# Patient Record
Sex: FEMALE | Race: WHITE | NOT HISPANIC OR LATINO | Employment: PART TIME | URBAN - METROPOLITAN AREA
[De-identification: names, ages, dates, MRNs, and addresses within clinical notes are randomized per-mention and may not be internally consistent; named-entity substitution may affect disease eponyms.]

---

## 2024-02-05 ENCOUNTER — APPOINTMENT (OUTPATIENT)
Dept: PRIMARY CARE | Facility: CLINIC | Age: 24
End: 2024-02-05
Payer: COMMERCIAL

## 2024-02-26 ENCOUNTER — APPOINTMENT (OUTPATIENT)
Dept: LAB | Facility: LAB | Age: 24
End: 2024-02-26
Payer: COMMERCIAL

## 2024-02-26 ENCOUNTER — LAB (OUTPATIENT)
Dept: LAB | Facility: LAB | Age: 24
End: 2024-02-26
Payer: COMMERCIAL

## 2024-02-26 ENCOUNTER — OFFICE VISIT (OUTPATIENT)
Dept: PRIMARY CARE | Facility: CLINIC | Age: 24
End: 2024-02-26
Payer: COMMERCIAL

## 2024-02-26 VITALS
BODY MASS INDEX: 36.86 KG/M2 | DIASTOLIC BLOOD PRESSURE: 84 MMHG | WEIGHT: 208 LBS | HEART RATE: 84 BPM | SYSTOLIC BLOOD PRESSURE: 124 MMHG | HEIGHT: 63 IN

## 2024-02-26 DIAGNOSIS — E55.9 VITAMIN D DEFICIENCY: ICD-10-CM

## 2024-02-26 DIAGNOSIS — R53.83 OTHER FATIGUE: ICD-10-CM

## 2024-02-26 DIAGNOSIS — Z00.00 ENCOUNTER FOR PREVENTIVE HEALTH EXAMINATION: Primary | ICD-10-CM

## 2024-02-26 DIAGNOSIS — E55.9 VITAMIN D DEFICIENCY, UNSPECIFIED: Primary | ICD-10-CM

## 2024-02-26 DIAGNOSIS — S69.91XS FINGER INJURY, RIGHT, SEQUELA: ICD-10-CM

## 2024-02-26 DIAGNOSIS — Z11.59 ENCOUNTER FOR HEPATITIS C SCREENING TEST FOR LOW RISK PATIENT: ICD-10-CM

## 2024-02-26 DIAGNOSIS — E66.9 CLASS 2 OBESITY WITHOUT SERIOUS COMORBIDITY WITH BODY MASS INDEX (BMI) OF 36.0 TO 36.9 IN ADULT, UNSPECIFIED OBESITY TYPE: ICD-10-CM

## 2024-02-26 DIAGNOSIS — Z00.00 ENCOUNTER FOR GENERAL ADULT MEDICAL EXAMINATION WITHOUT ABNORMAL FINDINGS: Primary | ICD-10-CM

## 2024-02-26 DIAGNOSIS — R63.5 WEIGHT GAIN: ICD-10-CM

## 2024-02-26 DIAGNOSIS — Z00.00 ENCOUNTER FOR PREVENTIVE HEALTH EXAMINATION: ICD-10-CM

## 2024-02-26 PROBLEM — J45.990 EXERCISE-INDUCED ASTHMA (HHS-HCC): Status: RESOLVED | Noted: 2024-02-26 | Resolved: 2024-02-26

## 2024-02-26 PROBLEM — J45.990 EXERCISE-INDUCED ASTHMA (HHS-HCC): Status: ACTIVE | Noted: 2024-02-26

## 2024-02-26 PROBLEM — E66.812 CLASS 2 OBESITY WITHOUT SERIOUS COMORBIDITY WITH BODY MASS INDEX (BMI) OF 36.0 TO 36.9 IN ADULT: Status: ACTIVE | Noted: 2024-02-26

## 2024-02-26 PROBLEM — E73.9 LACTOSE INTOLERANCE: Status: ACTIVE | Noted: 2021-09-10

## 2024-02-26 PROCEDURE — 1036F TOBACCO NON-USER: CPT | Performed by: STUDENT IN AN ORGANIZED HEALTH CARE EDUCATION/TRAINING PROGRAM

## 2024-02-26 PROCEDURE — 99214 OFFICE O/P EST MOD 30 MIN: CPT | Performed by: STUDENT IN AN ORGANIZED HEALTH CARE EDUCATION/TRAINING PROGRAM

## 2024-02-26 PROCEDURE — 36415 COLL VENOUS BLD VENIPUNCTURE: CPT

## 2024-02-26 PROCEDURE — 3008F BODY MASS INDEX DOCD: CPT | Performed by: STUDENT IN AN ORGANIZED HEALTH CARE EDUCATION/TRAINING PROGRAM

## 2024-02-26 PROCEDURE — 99385 PREV VISIT NEW AGE 18-39: CPT | Performed by: STUDENT IN AN ORGANIZED HEALTH CARE EDUCATION/TRAINING PROGRAM

## 2024-02-26 ASSESSMENT — PATIENT HEALTH QUESTIONNAIRE - PHQ9
2. FEELING DOWN, DEPRESSED OR HOPELESS: NOT AT ALL
1. LITTLE INTEREST OR PLEASURE IN DOING THINGS: NOT AT ALL
SUM OF ALL RESPONSES TO PHQ9 QUESTIONS 1 AND 2: 0

## 2024-02-26 NOTE — PROGRESS NOTES
Subjective   Patient ID: Liss Lund is a 23 y.o. female who presents for Annual Exam.    HPI  Diet is well balanced.  Working on adding regular exercise to their routine.  PAP is up to date.  Vaccines are up to date.  Dental exam is up to date.  Vision exam is up to date.  Patient is fasting for labs today.  Social: Tobacco use- denies   ;   Alcohol use- occasional    Other concerns addressed today include:   Patient states she has been having trouble losing weight and has actually been gaining weight even though she has not changed anything in her lifestyle and actually eats a pretty balanced diet.  She has a family history of hypothyroidism and wants to make sure that it is not a thyroid disorder taking it at this point.  She does feel quite fatigued as well.  She states when she wakes up in the morning, she does not feel rested.  She states that she should go since she gets about 8 hours of typically uninterrupted sleep.  She denies any nighttime waking.  She does not drink alcohol.  Denies any bowel symptoms.    Also would like evaluation of a right third finger growth on the distal end of her finger.  She states that she had an injury to that finger when she was 16 and over the last year or 2, it started getting some chronic swelling and now this enlarging growth.  It is kind of bothering her at this point because it looks weird and feels weird at times.  No pain, numbness or tingling.    Review of Systems  All pertinent positive symptoms are included in the history of present illness.    All other systems have been reviewed and are negative and noncontributory to this patient's current ailments.     Social History     Tobacco Use    Smoking status: Never    Smokeless tobacco: Never   Substance Use Topics    Alcohol use: Not on file      History reviewed. No pertinent surgical history.   No Known Allergies     No current outpatient medications on file.     No current facility-administered medications for  "this visit.        Patient Active Problem List   Diagnosis    Vitamin D deficiency    Finger injury, right, sequela    Other fatigue    Class 2 obesity without serious comorbidity with body mass index (BMI) of 36.0 to 36.9 in adult    Lactose intolerance      Immunization History   Administered Date(s) Administered    DT (pediatric) 04/08/2005    DTaP, Unspecified 02/09/2001, 07/17/2001, 09/26/2001, 01/09/2002, 01/21/2016    HPV 9-valent vaccine (GARDASIL 9) 05/16/2016, 06/16/2016    Hepatitis A vaccine, pediatric/adolescent (HAVRIX, VAQTA) 05/16/2016    Hepatitis B vaccine, pediatric/adolescent (RECOMBIVAX, ENGERIX) 2000, 2000, 02/09/2001    HiB PRP-T conjugate vaccine (HIBERIX, ACTHIB) 02/09/2011    Hib (HbOC) 2000, 2000, 02/09/2001, 01/09/2002    MMR vaccine, subcutaneous (MMR II) 10/17/2001, 04/08/2005    Meningococcal MCV4P 08/30/2012, 08/16/2017    OPV 01/09/2002, 04/08/2005    Poliovirus vaccine, subcutaneous (IPOL) 2000, 2000    Tdap vaccine, age 7 year and older (BOOSTRIX, ADACEL) 08/30/2012, 05/03/2018    Varicella vaccine, subcutaneous (VARIVAX) 07/17/2001, 05/19/2015       Objective   VITALS  /84   Pulse 84   Ht 1.6 m (5' 3\")   Wt 94.3 kg (208 lb)   BMI 36.85 kg/m²      Physical Exam  CONSTITUTIONAL - well nourished, well developed, looks like stated age, in no acute distress, not ill-appearing, and not tired appearing  SKIN - normal skin color and pigmentation, normal skin turgor without rash, lesions, or nodules visualized  HEAD - no trauma, normocephalic  EYES - pupils are equal and reactive to light, extraocular muscles are intact, and normal external exam  ENT - TM's intact, no injection, no signs of infection, uvula midline, normal tongue movement and throat normal, no exudate, nasal passage without discharge and patent  NECK - supple without rigidity, no neck mass was observed, no thyromegaly or thyroid nodules  CHEST - clear to auscultation, no " wheezing, no crackles and no rales, good effort  CARDIAC - regular rate and regular rhythm, no skipped beats, no murmur  ABDOMEN - no organomegaly, soft, nontender, nondistended, normal bowel sounds, no guarding/rebound/rigidity, negative McBurney sign and negative Denson sign  EXTREMITIES - no edema, firm growth at the distal phalanx of the right 3rd finger  NEUROLOGICAL - normal gait, normal balance, normal motor, no ataxia, DTRs equal and symmetrical; alert, oriented and no focal signs  PSYCHIATRIC - alert, pleasant and cordial, age-appropriate  IMMUNOLOGIC - no cervical lymphadenopathy     Assessment/Plan   Diagnoses and all orders for this visit:  Encounter for preventive health examination  A complete history and physical was performed today.  Vaccines are up to date.  PAP is up to date.  Fasting labs ordered today include:  -     CBC; Future  -     Comprehensive Metabolic Panel; Future  -     Hemoglobin A1C; Future  -     Lipid Panel; Future  -     TSH with reflex to Free T4 if abnormal; Future  Encounter for hepatitis C screening test for low risk patient  -     Hepatitis C Antibody; Future  Weight gain  Will start with labs at this time.  I recommend she start monitoring and logging the food she is eating every day.  Start adding in more physical activity.  Vitamin D deficiency  -     Vitamin D 25-Hydroxy,Total (for eval of Vitamin D levels); Future  Other fatigue  Will start with labs.  If they are unremarkable, may be get a sleep study since it sounds like she is having nonrestorative sleep  Finger injury, right, sequela  -     XR hand right 3+ views; Future  -     Referral to Orthopaedic Surgery; Future  Class 2 obesity without serious comorbidity with body mass index (BMI) of 36.0 to 36.9 in adult, unspecified obesity type  Continue striving for a well-balanced diet with regular physical activity    She should follow-up yearly for physical exams and sooner as needed

## 2024-03-11 ENCOUNTER — APPOINTMENT (OUTPATIENT)
Dept: ORTHOPEDIC SURGERY | Facility: CLINIC | Age: 24
End: 2024-03-11
Payer: COMMERCIAL

## 2024-03-18 ENCOUNTER — LAB (OUTPATIENT)
Dept: LAB | Facility: LAB | Age: 24
End: 2024-03-18
Payer: COMMERCIAL

## 2024-03-18 DIAGNOSIS — Z00.00 ENCOUNTER FOR GENERAL ADULT MEDICAL EXAMINATION WITHOUT ABNORMAL FINDINGS: ICD-10-CM

## 2024-03-18 LAB
25(OH)D3 SERPL-MCNC: <7 NG/ML (ref 30–100)
ALBUMIN SERPL BCP-MCNC: 4.4 G/DL (ref 3.4–5)
ALP SERPL-CCNC: 76 U/L (ref 33–110)
ALT SERPL W P-5'-P-CCNC: 24 U/L (ref 7–45)
ANION GAP SERPL CALC-SCNC: 13 MMOL/L (ref 10–20)
AST SERPL W P-5'-P-CCNC: 14 U/L (ref 9–39)
BILIRUB SERPL-MCNC: 0.3 MG/DL (ref 0–1.2)
BUN SERPL-MCNC: 10 MG/DL (ref 6–23)
CALCIUM SERPL-MCNC: 9.1 MG/DL (ref 8.6–10.3)
CHLORIDE SERPL-SCNC: 105 MMOL/L (ref 98–107)
CHOLEST SERPL-MCNC: 120 MG/DL (ref 0–199)
CHOLESTEROL/HDL RATIO: 2.5
CO2 SERPL-SCNC: 24 MMOL/L (ref 21–32)
CREAT SERPL-MCNC: 0.7 MG/DL (ref 0.5–1.05)
EGFRCR SERPLBLD CKD-EPI 2021: >90 ML/MIN/1.73M*2
ERYTHROCYTE [DISTWIDTH] IN BLOOD BY AUTOMATED COUNT: 14.5 % (ref 11.5–14.5)
GLUCOSE SERPL-MCNC: 91 MG/DL (ref 74–99)
HCT VFR BLD AUTO: 41.3 % (ref 36–46)
HCV AB SER QL: NONREACTIVE
HDLC SERPL-MCNC: 47.8 MG/DL
HGB BLD-MCNC: 13.1 G/DL (ref 12–16)
LDLC SERPL CALC-MCNC: 59 MG/DL
MCH RBC QN AUTO: 26.9 PG (ref 26–34)
MCHC RBC AUTO-ENTMCNC: 31.7 G/DL (ref 32–36)
MCV RBC AUTO: 85 FL (ref 80–100)
NON HDL CHOLESTEROL: 72 MG/DL (ref 0–149)
NRBC BLD-RTO: 0 /100 WBCS (ref 0–0)
PLATELET # BLD AUTO: 335 X10*3/UL (ref 150–450)
POTASSIUM SERPL-SCNC: 4.7 MMOL/L (ref 3.5–5.3)
PROT SERPL-MCNC: 7.1 G/DL (ref 6.4–8.2)
RBC # BLD AUTO: 4.87 X10*6/UL (ref 4–5.2)
SODIUM SERPL-SCNC: 137 MMOL/L (ref 136–145)
TRIGL SERPL-MCNC: 66 MG/DL (ref 0–149)
TSH SERPL-ACNC: 2 MIU/L (ref 0.44–3.98)
VLDL: 13 MG/DL (ref 0–40)
WBC # BLD AUTO: 11.4 X10*3/UL (ref 4.4–11.3)

## 2024-03-18 PROCEDURE — 86803 HEPATITIS C AB TEST: CPT

## 2024-03-18 PROCEDURE — 84443 ASSAY THYROID STIM HORMONE: CPT

## 2024-03-18 PROCEDURE — 85027 COMPLETE CBC AUTOMATED: CPT

## 2024-03-18 PROCEDURE — 82306 VITAMIN D 25 HYDROXY: CPT

## 2024-03-18 PROCEDURE — 80061 LIPID PANEL: CPT

## 2024-03-18 PROCEDURE — 80053 COMPREHEN METABOLIC PANEL: CPT

## 2024-03-18 PROCEDURE — 83036 HEMOGLOBIN GLYCOSYLATED A1C: CPT

## 2024-03-18 PROCEDURE — 36415 COLL VENOUS BLD VENIPUNCTURE: CPT

## 2024-03-19 DIAGNOSIS — E55.9 VITAMIN D DEFICIENCY: Primary | ICD-10-CM

## 2024-03-19 LAB
EST. AVERAGE GLUCOSE BLD GHB EST-MCNC: 123 MG/DL
HBA1C MFR BLD: 5.9 %

## 2024-03-19 RX ORDER — ASPIRIN 325 MG
50000 TABLET, DELAYED RELEASE (ENTERIC COATED) ORAL
Qty: 12 CAPSULE | Refills: 3 | Status: SHIPPED | OUTPATIENT
Start: 2024-03-19 | End: 2025-03-19

## 2024-04-08 ENCOUNTER — APPOINTMENT (OUTPATIENT)
Dept: ORTHOPEDIC SURGERY | Facility: CLINIC | Age: 24
End: 2024-04-08
Payer: COMMERCIAL

## 2024-04-22 ENCOUNTER — HOSPITAL ENCOUNTER (OUTPATIENT)
Dept: RADIOLOGY | Facility: CLINIC | Age: 24
Discharge: HOME | End: 2024-04-22
Payer: COMMERCIAL

## 2024-04-22 DIAGNOSIS — S69.91XS FINGER INJURY, RIGHT, SEQUELA: ICD-10-CM

## 2024-04-22 PROCEDURE — 73130 X-RAY EXAM OF HAND: CPT | Mod: RIGHT SIDE | Performed by: STUDENT IN AN ORGANIZED HEALTH CARE EDUCATION/TRAINING PROGRAM

## 2024-04-22 PROCEDURE — 73130 X-RAY EXAM OF HAND: CPT | Mod: RT

## 2024-04-29 ENCOUNTER — OFFICE VISIT (OUTPATIENT)
Dept: ORTHOPEDIC SURGERY | Facility: CLINIC | Age: 24
End: 2024-04-29
Payer: COMMERCIAL

## 2024-04-29 DIAGNOSIS — R22.31 FINGER MASS, RIGHT: ICD-10-CM

## 2024-04-29 DIAGNOSIS — S69.91XS FINGER INJURY, RIGHT, SEQUELA: Primary | ICD-10-CM

## 2024-04-29 PROCEDURE — 1036F TOBACCO NON-USER: CPT | Performed by: ORTHOPAEDIC SURGERY

## 2024-04-29 PROCEDURE — 3008F BODY MASS INDEX DOCD: CPT | Performed by: ORTHOPAEDIC SURGERY

## 2024-04-29 PROCEDURE — 99204 OFFICE O/P NEW MOD 45 MIN: CPT | Performed by: ORTHOPAEDIC SURGERY

## 2024-04-29 NOTE — PROGRESS NOTES
History of Present Illness:  Chief Complaint   Patient presents with    Right Hand - Pain     Right middle finger pain s/p injury 5yrs ago       Patient presents today for evaluation of right middle finger pain/mass.  She had an injury to her middle finger about 5 years ago.  Ever since time of that injury she developed a bump about the dorsal aspect of the DIP joint near her nail.  This has been somewhat increasing in size and a second bump appeared next to the original 1 about 3 to 4 months ago.  She does have some soreness and has also noticed change in her nail.  No numbness or tingling.  No loss of  strength.  No drainage from the mass.    History reviewed. No pertinent past medical history.    Medication Documentation Review Audit       Reviewed by Rupali Hoffmann MA (Medical Assistant) on 04/29/24 at 0936      Medication Order Taking? Sig Documenting Provider Last Dose Status   cholecalciferol (Vitamin D-3) 50,000 unit capsule [id number]  Take 1 capsule (50,000 Units) by mouth 1 (one) time per week. Denisse Ramires, DO  Active                    No Known Allergies    Social History     Socioeconomic History    Marital status: Significant Other     Spouse name: Not on file    Number of children: Not on file    Years of education: Not on file    Highest education level: Not on file   Occupational History    Not on file   Tobacco Use    Smoking status: Never    Smokeless tobacco: Never   Substance and Sexual Activity    Alcohol use: Yes     Alcohol/week: 2.0 standard drinks of alcohol     Types: 1 Glasses of wine, 1 Standard drinks or equivalent per week    Drug use: Never    Sexual activity: Yes     Partners: Male     Birth control/protection: I.U.D.   Other Topics Concern    Not on file   Social History Narrative    Not on file     Social Determinants of Health     Financial Resource Strain: Low Risk  (3/10/2023)    Received from [facility]    Overall Financial Resource Strain (CARDIA)     Difficulty of  Paying Living Expenses: Not very hard   Food Insecurity: No Food Insecurity (3/10/2023)    Received from [facility]    Hunger Vital Sign     Worried About Running Out of Food in the Last Year: Never true     Ran Out of Food in the Last Year: Never true   Transportation Needs: No Transportation Needs (3/10/2023)    Received from [facility]    PRAPARE - Transportation     Lack of Transportation (Medical): No     Lack of Transportation (Non-Medical): No   Physical Activity: Insufficiently Active (3/10/2023)    Received from [facility]    Exercise Vital Sign     Days of Exercise per Week: 2 days     Minutes of Exercise per Session: 30 min   Stress: Stress Concern Present (3/10/2023)    Received from [facility]    [employer] - Occupational Stress Questionnaire     Feeling of Stress : To some extent   Social Connections: Moderately Integrated (3/10/2023)    Received from [facility]    Social Connection and Isolation Panel [NHANES]     Frequency of Communication with Friends and Family: More than three times a week     Frequency of Social Gatherings with Friends and Family: Once a week     Attends Buddhist Services: 1 to 4 times per year     Active Member of Clubs or Organizations: No     Attends Club or Organization Meetings: Never     Marital Status: Living with partner   Intimate Partner Violence: Not At Risk (3/10/2023)    Received from [facility]    Humiliation, Afraid, Rape, and Kick questionnaire     Fear of Current or Ex-Partner: No     Emotionally Abused: No     Physically Abused: No     Sexually Abused: No   Housing Stability: Low Risk  (3/10/2023)    Received from [facility]    Housing Stability Vital Sign     Unable to Pay for Housing in the Last Year: No     Number of Places Lived in the Last Year: 1     Unstable Housing in the Last Year: No       History reviewed. No pertinent surgical history.     Review of Systems   GENERAL: Negative for malaise, significant  weight loss, fever  MUSCULOSKELETAL: see HPI  NEURO:  Negative     Physical Examination  Constitutional: Appears well-developed and well-nourished.  Head: Normocephalic and atraumatic.  Eyes: EOMI grossly  Cardiovascular: Intact distal pulses.   Respiratory: Effort normal. No respiratory distress.  Neurologic: Alert and oriented to person, place, and time.  Skin: Skin is warm and dry.  Hematologic / Lymphatic: No lymphedema, lymphangitis.  Psychiatric: normal mood and affect. Behavior is normal.   Musculoskeletal:  Right middle finger: Palpable mass extending from DIP to proximal eponychial fold.  There are nail plate changes to the tip of the digit with ridging and undulations.  Mass appears to transilluminate.  DIP with 0-25 degrees flexion.  Sensation intact distally.  No extensor lag appreciated.    Radiographs: Right hand radiographs from April 22, 2024 available for my review/interpretation: No fracture/dislocation.  Joint spaces grossly maintained.  Soft tissue fullness about the dorsal aspect of middle finger distal phalanx     Assessment:  Patient with right middle finger mass, possible mucous cyst     Plan:  Nature of the diagnosis was discussed with the patient.  We discussed potential differential diagnoses as well as risks and benefits of continued observation versus surgical excision.  We did discuss potential for recurrence as well as possible need for additional intervention pending pathology review.  After thoroughly reviewing patient wishes to proceed with surgical excision.    Risks and benefits of continued nonoperative versus operative treatment options were reviewed.  The surgical benefits and the potential complications were reviewed with the patient today, as well as the day surgical setting and the likely postoperative course.  Patient understands that the goal of surgery is prevention of worsening symptoms and potential relief of some symptoms.  Risks discussed included, but were not  limited to, residual/recurrent/worsening symptoms, pain, infection, bleeding, stiffness, injury to nerve/blood vessels/tendons, wound healing issues and possible need for reoperation.  I answered the patient's questions and surgical consent reviewed and obtained.  The patient has elected to proceed with surgery and we will schedule it at the patient's convenience.    The patient will followup with us in the operating room and then postoperatively.

## 2024-05-03 ENCOUNTER — HOSPITAL ENCOUNTER (EMERGENCY)
Facility: HOSPITAL | Age: 24
Discharge: HOME | End: 2024-05-03
Attending: STUDENT IN AN ORGANIZED HEALTH CARE EDUCATION/TRAINING PROGRAM
Payer: COMMERCIAL

## 2024-05-03 ENCOUNTER — APPOINTMENT (OUTPATIENT)
Dept: RADIOLOGY | Facility: HOSPITAL | Age: 24
End: 2024-05-03
Payer: COMMERCIAL

## 2024-05-03 VITALS
TEMPERATURE: 98.8 F | BODY MASS INDEX: 36.32 KG/M2 | RESPIRATION RATE: 16 BRPM | WEIGHT: 205 LBS | SYSTOLIC BLOOD PRESSURE: 129 MMHG | DIASTOLIC BLOOD PRESSURE: 88 MMHG | OXYGEN SATURATION: 100 % | HEART RATE: 69 BPM | HEIGHT: 63 IN

## 2024-05-03 DIAGNOSIS — R10.9 ABDOMINAL PAIN, UNSPECIFIED ABDOMINAL LOCATION: Primary | ICD-10-CM

## 2024-05-03 LAB
ALBUMIN SERPL BCP-MCNC: 4.7 G/DL (ref 3.4–5)
ALP SERPL-CCNC: 75 U/L (ref 33–110)
ALT SERPL W P-5'-P-CCNC: 20 U/L (ref 7–45)
ANION GAP SERPL CALC-SCNC: 12 MMOL/L (ref 10–20)
APPEARANCE UR: CLEAR
AST SERPL W P-5'-P-CCNC: 19 U/L (ref 9–39)
BASOPHILS # BLD AUTO: 0.07 X10*3/UL (ref 0–0.1)
BASOPHILS NFR BLD AUTO: 0.7 %
BILIRUB SERPL-MCNC: 0.3 MG/DL (ref 0–1.2)
BILIRUB UR STRIP.AUTO-MCNC: NEGATIVE MG/DL
BUN SERPL-MCNC: 10 MG/DL (ref 6–23)
CALCIUM SERPL-MCNC: 9.7 MG/DL (ref 8.6–10.3)
CHLORIDE SERPL-SCNC: 102 MMOL/L (ref 98–107)
CO2 SERPL-SCNC: 27 MMOL/L (ref 21–32)
COLOR UR: YELLOW
CREAT SERPL-MCNC: 0.86 MG/DL (ref 0.5–1.05)
EGFRCR SERPLBLD CKD-EPI 2021: >90 ML/MIN/1.73M*2
EOSINOPHIL # BLD AUTO: 0.09 X10*3/UL (ref 0–0.7)
EOSINOPHIL NFR BLD AUTO: 0.9 %
ERYTHROCYTE [DISTWIDTH] IN BLOOD BY AUTOMATED COUNT: 13.5 % (ref 11.5–14.5)
GLUCOSE SERPL-MCNC: 94 MG/DL (ref 74–99)
GLUCOSE UR STRIP.AUTO-MCNC: NEGATIVE MG/DL
HCG UR QL IA.RAPID: NEGATIVE
HCT VFR BLD AUTO: 41.5 % (ref 36–46)
HGB BLD-MCNC: 13.3 G/DL (ref 12–16)
IMM GRANULOCYTES # BLD AUTO: 0.02 X10*3/UL (ref 0–0.7)
IMM GRANULOCYTES NFR BLD AUTO: 0.2 % (ref 0–0.9)
KETONES UR STRIP.AUTO-MCNC: NEGATIVE MG/DL
LEUKOCYTE ESTERASE UR QL STRIP.AUTO: NEGATIVE
LIPASE SERPL-CCNC: 15 U/L (ref 9–82)
LYMPHOCYTES # BLD AUTO: 1.89 X10*3/UL (ref 1.2–4.8)
LYMPHOCYTES NFR BLD AUTO: 18.8 %
MAGNESIUM SERPL-MCNC: 1.95 MG/DL (ref 1.6–2.4)
MCH RBC QN AUTO: 26.4 PG (ref 26–34)
MCHC RBC AUTO-ENTMCNC: 32 G/DL (ref 32–36)
MCV RBC AUTO: 83 FL (ref 80–100)
MONOCYTES # BLD AUTO: 0.75 X10*3/UL (ref 0.1–1)
MONOCYTES NFR BLD AUTO: 7.5 %
MUCOUS THREADS #/AREA URNS AUTO: NORMAL /LPF
NEUTROPHILS # BLD AUTO: 7.21 X10*3/UL (ref 1.2–7.7)
NEUTROPHILS NFR BLD AUTO: 71.9 %
NITRITE UR QL STRIP.AUTO: NEGATIVE
NRBC BLD-RTO: 0 /100 WBCS (ref 0–0)
PH UR STRIP.AUTO: 6 [PH]
PLATELET # BLD AUTO: 369 X10*3/UL (ref 150–450)
POTASSIUM SERPL-SCNC: 3.9 MMOL/L (ref 3.5–5.3)
PROT SERPL-MCNC: 8.1 G/DL (ref 6.4–8.2)
PROT UR STRIP.AUTO-MCNC: NEGATIVE MG/DL
RBC # BLD AUTO: 5.03 X10*6/UL (ref 4–5.2)
RBC # UR STRIP.AUTO: ABNORMAL /UL
RBC #/AREA URNS AUTO: NORMAL /HPF
SODIUM SERPL-SCNC: 137 MMOL/L (ref 136–145)
SP GR UR STRIP.AUTO: 1.01
SQUAMOUS #/AREA URNS AUTO: NORMAL /HPF
UROBILINOGEN UR STRIP.AUTO-MCNC: <2 MG/DL
WBC # BLD AUTO: 10 X10*3/UL (ref 4.4–11.3)
WBC #/AREA URNS AUTO: NORMAL /HPF

## 2024-05-03 PROCEDURE — 99284 EMERGENCY DEPT VISIT MOD MDM: CPT | Mod: 25

## 2024-05-03 PROCEDURE — 83690 ASSAY OF LIPASE: CPT | Performed by: STUDENT IN AN ORGANIZED HEALTH CARE EDUCATION/TRAINING PROGRAM

## 2024-05-03 PROCEDURE — 80053 COMPREHEN METABOLIC PANEL: CPT | Performed by: STUDENT IN AN ORGANIZED HEALTH CARE EDUCATION/TRAINING PROGRAM

## 2024-05-03 PROCEDURE — 81001 URINALYSIS AUTO W/SCOPE: CPT | Performed by: STUDENT IN AN ORGANIZED HEALTH CARE EDUCATION/TRAINING PROGRAM

## 2024-05-03 PROCEDURE — 96375 TX/PRO/DX INJ NEW DRUG ADDON: CPT

## 2024-05-03 PROCEDURE — 71046 X-RAY EXAM CHEST 2 VIEWS: CPT | Performed by: SURGERY

## 2024-05-03 PROCEDURE — 2500000001 HC RX 250 WO HCPCS SELF ADMINISTERED DRUGS (ALT 637 FOR MEDICARE OP): Performed by: STUDENT IN AN ORGANIZED HEALTH CARE EDUCATION/TRAINING PROGRAM

## 2024-05-03 PROCEDURE — 74176 CT ABD & PELVIS W/O CONTRAST: CPT

## 2024-05-03 PROCEDURE — 36415 COLL VENOUS BLD VENIPUNCTURE: CPT | Performed by: STUDENT IN AN ORGANIZED HEALTH CARE EDUCATION/TRAINING PROGRAM

## 2024-05-03 PROCEDURE — 2500000004 HC RX 250 GENERAL PHARMACY W/ HCPCS (ALT 636 FOR OP/ED): Performed by: STUDENT IN AN ORGANIZED HEALTH CARE EDUCATION/TRAINING PROGRAM

## 2024-05-03 PROCEDURE — 74176 CT ABD & PELVIS W/O CONTRAST: CPT | Performed by: RADIOLOGY

## 2024-05-03 PROCEDURE — 71046 X-RAY EXAM CHEST 2 VIEWS: CPT

## 2024-05-03 PROCEDURE — 83735 ASSAY OF MAGNESIUM: CPT | Performed by: STUDENT IN AN ORGANIZED HEALTH CARE EDUCATION/TRAINING PROGRAM

## 2024-05-03 PROCEDURE — 2500000005 HC RX 250 GENERAL PHARMACY W/O HCPCS: Performed by: STUDENT IN AN ORGANIZED HEALTH CARE EDUCATION/TRAINING PROGRAM

## 2024-05-03 PROCEDURE — 85025 COMPLETE CBC W/AUTO DIFF WBC: CPT | Performed by: STUDENT IN AN ORGANIZED HEALTH CARE EDUCATION/TRAINING PROGRAM

## 2024-05-03 PROCEDURE — 81025 URINE PREGNANCY TEST: CPT | Performed by: STUDENT IN AN ORGANIZED HEALTH CARE EDUCATION/TRAINING PROGRAM

## 2024-05-03 PROCEDURE — 96374 THER/PROPH/DIAG INJ IV PUSH: CPT

## 2024-05-03 PROCEDURE — 96361 HYDRATE IV INFUSION ADD-ON: CPT

## 2024-05-03 RX ORDER — IBUPROFEN 600 MG/1
600 TABLET ORAL EVERY 6 HOURS PRN
Qty: 30 TABLET | Refills: 0 | Status: SHIPPED | OUTPATIENT
Start: 2024-05-03 | End: 2024-05-03 | Stop reason: SINTOL

## 2024-05-03 RX ORDER — DICYCLOMINE HYDROCHLORIDE 10 MG/1
20 CAPSULE ORAL ONCE
Status: COMPLETED | OUTPATIENT
Start: 2024-05-03 | End: 2024-05-03

## 2024-05-03 RX ORDER — ONDANSETRON 4 MG/1
4 TABLET, ORALLY DISINTEGRATING ORAL EVERY 8 HOURS PRN
Qty: 21 TABLET | Refills: 0 | Status: SHIPPED | OUTPATIENT
Start: 2024-05-03 | End: 2024-05-03

## 2024-05-03 RX ORDER — ORPHENADRINE CITRATE 30 MG/ML
60 INJECTION INTRAMUSCULAR; INTRAVENOUS ONCE
Status: COMPLETED | OUTPATIENT
Start: 2024-05-03 | End: 2024-05-03

## 2024-05-03 RX ORDER — ACETAMINOPHEN 325 MG/1
650 TABLET ORAL EVERY 6 HOURS PRN
Qty: 30 TABLET | Refills: 0 | Status: SHIPPED | OUTPATIENT
Start: 2024-05-03 | End: 2024-05-03

## 2024-05-03 RX ORDER — ACETAMINOPHEN 325 MG/1
975 TABLET ORAL ONCE
Status: COMPLETED | OUTPATIENT
Start: 2024-05-03 | End: 2024-05-03

## 2024-05-03 RX ORDER — ORPHENADRINE CITRATE 100 MG/1
100 TABLET, EXTENDED RELEASE ORAL 2 TIMES DAILY PRN
Qty: 14 TABLET | Refills: 0 | Status: SHIPPED | OUTPATIENT
Start: 2024-05-03 | End: 2024-06-13 | Stop reason: ALTCHOICE

## 2024-05-03 RX ORDER — ACETAMINOPHEN 325 MG/1
650 TABLET ORAL EVERY 6 HOURS PRN
Qty: 30 TABLET | Refills: 0 | Status: SHIPPED | OUTPATIENT
Start: 2024-05-03

## 2024-05-03 RX ORDER — LIDOCAINE 50 MG/G
1 PATCH TOPICAL DAILY
Qty: 5 PATCH | Refills: 0 | Status: SHIPPED | OUTPATIENT
Start: 2024-05-03 | End: 2024-06-13 | Stop reason: WASHOUT

## 2024-05-03 RX ORDER — ONDANSETRON 4 MG/1
4 TABLET, ORALLY DISINTEGRATING ORAL EVERY 8 HOURS PRN
Qty: 21 TABLET | Refills: 0 | Status: SHIPPED | OUTPATIENT
Start: 2024-05-03 | End: 2024-05-10

## 2024-05-03 RX ORDER — KETOROLAC TROMETHAMINE 10 MG/1
10 TABLET, FILM COATED ORAL EVERY 6 HOURS PRN
Qty: 12 TABLET | Refills: 0 | Status: SHIPPED | OUTPATIENT
Start: 2024-05-03 | End: 2024-05-06

## 2024-05-03 RX ORDER — ONDANSETRON HYDROCHLORIDE 2 MG/ML
4 INJECTION, SOLUTION INTRAVENOUS ONCE
Status: COMPLETED | OUTPATIENT
Start: 2024-05-03 | End: 2024-05-03

## 2024-05-03 RX ORDER — KETOROLAC TROMETHAMINE 15 MG/ML
15 INJECTION, SOLUTION INTRAMUSCULAR; INTRAVENOUS ONCE
Status: COMPLETED | OUTPATIENT
Start: 2024-05-03 | End: 2024-05-03

## 2024-05-03 RX ORDER — DICYCLOMINE HYDROCHLORIDE 20 MG/1
20 TABLET ORAL 4 TIMES DAILY PRN
Qty: 30 TABLET | Refills: 0 | Status: SHIPPED | OUTPATIENT
Start: 2024-05-03 | End: 2024-06-13 | Stop reason: ALTCHOICE

## 2024-05-03 RX ADMIN — HYDROMORPHONE HYDROCHLORIDE 0.5 MG: 1 INJECTION, SOLUTION INTRAMUSCULAR; INTRAVENOUS; SUBCUTANEOUS at 20:19

## 2024-05-03 RX ADMIN — Medication 10 ML: at 19:30

## 2024-05-03 RX ADMIN — ORPHENADRINE CITRATE 60 MG: 60 INJECTION INTRAMUSCULAR; INTRAVENOUS at 21:47

## 2024-05-03 RX ADMIN — ONDANSETRON 4 MG: 2 INJECTION INTRAMUSCULAR; INTRAVENOUS at 19:38

## 2024-05-03 RX ADMIN — SODIUM CHLORIDE, POTASSIUM CHLORIDE, SODIUM LACTATE AND CALCIUM CHLORIDE 1000 ML: 600; 310; 30; 20 INJECTION, SOLUTION INTRAVENOUS at 19:39

## 2024-05-03 RX ADMIN — KETOROLAC TROMETHAMINE 15 MG: 15 INJECTION, SOLUTION INTRAMUSCULAR; INTRAVENOUS at 21:47

## 2024-05-03 RX ADMIN — DICYCLOMINE HYDROCHLORIDE 20 MG: 10 CAPSULE ORAL at 19:38

## 2024-05-03 RX ADMIN — ACETAMINOPHEN 975 MG: 325 TABLET ORAL at 19:37

## 2024-05-03 ASSESSMENT — PAIN SCALES - GENERAL: PAINLEVEL_OUTOF10: 8

## 2024-05-03 ASSESSMENT — PAIN DESCRIPTION - ORIENTATION: ORIENTATION: UPPER;MID

## 2024-05-03 ASSESSMENT — PAIN DESCRIPTION - LOCATION: LOCATION: ABDOMEN

## 2024-05-03 ASSESSMENT — PAIN DESCRIPTION - DESCRIPTORS: DESCRIPTORS: ACHING;CRAMPING

## 2024-05-03 ASSESSMENT — PAIN DESCRIPTION - PAIN TYPE: TYPE: ACUTE PAIN

## 2024-05-03 ASSESSMENT — COLUMBIA-SUICIDE SEVERITY RATING SCALE - C-SSRS
6. HAVE YOU EVER DONE ANYTHING, STARTED TO DO ANYTHING, OR PREPARED TO DO ANYTHING TO END YOUR LIFE?: NO
2. HAVE YOU ACTUALLY HAD ANY THOUGHTS OF KILLING YOURSELF?: NO
1. IN THE PAST MONTH, HAVE YOU WISHED YOU WERE DEAD OR WISHED YOU COULD GO TO SLEEP AND NOT WAKE UP?: NO

## 2024-05-03 ASSESSMENT — PAIN - FUNCTIONAL ASSESSMENT: PAIN_FUNCTIONAL_ASSESSMENT: 0-10

## 2024-05-03 NOTE — ED PROVIDER NOTES
HPI   Chief Complaint   Patient presents with    Abdominal Pain     3 days  of epigastric and back pain, getting worse.  Feels like when she goth her gall bladder out.       23-year-old female otherwise healthy here with 3 days of epigastric pain rating to the back.  States she simply woke up with the symptoms, notes mild shortness of breath with the symptoms, but denies chest pain, pleurisy, lower extremity edema, and is not on OCPs.  Also denies fevers, cough or hemoptysis, urinary flank or CVA symptoms, vaginal discharge.  Notes social alcohol use but denies any drug use.      History provided by:  Patient and medical records   used: No                        No data recorded                   Patient History   No past medical history on file.  No past surgical history on file.  Family History   Problem Relation Name Age of Onset    Diabetes Maternal Grandfather Alejandra     Diabetes Paternal Grandmother Lily      Social History     Tobacco Use    Smoking status: Never    Smokeless tobacco: Never   Substance Use Topics    Alcohol use: Yes     Alcohol/week: 2.0 standard drinks of alcohol     Types: 1 Glasses of wine, 1 Standard drinks or equivalent per week    Drug use: Never       Physical Exam   ED Triage Vitals [05/03/24 1921]   Temperature Heart Rate Respirations BP   37.4 °C (99.3 °F) 87 18 122/86      Pulse Ox Temp Source Heart Rate Source Patient Position   100 % Skin Monitor Sitting      BP Location FiO2 (%)     Left arm --       Physical Exam  Constitutional:       Appearance: Normal appearance.   HENT:      Head: Normocephalic and atraumatic.      Nose: No congestion or rhinorrhea.   Eyes:      General: No scleral icterus.     Extraocular Movements: Extraocular movements intact.      Pupils: Pupils are equal, round, and reactive to light.   Cardiovascular:      Rate and Rhythm: Normal rate and regular rhythm.      Pulses: Normal pulses.      Heart sounds: Normal heart sounds.    Pulmonary:      Effort: Pulmonary effort is normal. No respiratory distress.      Breath sounds: Normal breath sounds.   Abdominal:      Palpations: Abdomen is soft.      Tenderness: There is no right CVA tenderness or left CVA tenderness.      Comments: Mildly tender over epigastrium without rebound or guarding.   Musculoskeletal:         General: Normal range of motion.      Cervical back: Normal range of motion and neck supple.   Skin:     General: Skin is warm and dry.      Capillary Refill: Capillary refill takes less than 2 seconds.   Neurological:      General: No focal deficit present.      Mental Status: She is alert and oriented to person, place, and time.      Sensory: No sensory deficit.      Motor: No weakness.      Gait: Gait normal.   Psychiatric:         Mood and Affect: Mood normal.         Behavior: Behavior normal.         ED Course & MDM   Diagnoses as of 05/03/24 2159   Abdominal pain, unspecified abdominal location       Medical Decision Making  23-year-old female here with epigastric pain radiating to back.  Overall well-appearing with no peritonitis, tenderness over epigastrium and left and right CVA.  His lipase is negative which rules out pancreatitis, his urinalysis had a small amount of blood, and given the CVA symptoms, CT abdomen pelvis obtained which does not disclose any acute findings including nephrolithiasis.  Does have some postsurgical dilation of the biliary ducts and a small amount of fatty infiltration of the pancreas, discussed findings with patient, they we will follow-up outpatient to further discuss this.  She has no leukocytosis, her LFTs are reassuring, the rest of her labs are unremarkable.  Will give her symptomatic management at home, follow-up with her primary care doctor and return precautions.    Amount and/or Complexity of Data Reviewed  External Data Reviewed: notes.  Labs: ordered.  Radiology: ordered and independent interpretation  performed.    Risk  Prescription drug management.        Procedure  Procedures     Xiang Caldera MD  Resident  05/03/24 2115       Xiang Caldera MD  Resident  05/03/24 2159       Xiang Caldera MD  Resident  05/03/24 2203

## 2024-05-04 LAB — HOLD SPECIMEN: NORMAL

## 2024-05-04 NOTE — DISCHARGE INSTRUCTIONS
IMPRESSION:  1.  Kidneys appear normal. No hydronephrosis or urinary tract stone.  No findings to explain hematuria.  2. There is ill-defined region hypoattenuation in the pancreatic body  tail junction, without definitive ductal dilation or surrounding  edema. Unclear if there may be a cystic lesion or some focal fatty  infiltration in this region of the pancreas. Correlation with lipase  suggested. Suggest follow-up MRCP/MR pancreas.  3. Biliary dilation, could be due to reservoir effect although  slightly more pronounced than typically seen. The common duct appears  to taper in a normal manner and there are no definitive filling  defects identified or obstructing mass. May further evaluate bile  ducts at time of recommended pancreas MR.

## 2024-05-07 ENCOUNTER — OFFICE VISIT (OUTPATIENT)
Dept: PRIMARY CARE | Facility: CLINIC | Age: 24
End: 2024-05-07
Payer: COMMERCIAL

## 2024-05-07 VITALS
SYSTOLIC BLOOD PRESSURE: 110 MMHG | WEIGHT: 206 LBS | HEART RATE: 90 BPM | DIASTOLIC BLOOD PRESSURE: 82 MMHG | BODY MASS INDEX: 35.17 KG/M2 | HEIGHT: 64 IN

## 2024-05-07 DIAGNOSIS — K86.9 PANCREATIC LESION (HHS-HCC): Primary | ICD-10-CM

## 2024-05-07 PROCEDURE — 99214 OFFICE O/P EST MOD 30 MIN: CPT | Performed by: STUDENT IN AN ORGANIZED HEALTH CARE EDUCATION/TRAINING PROGRAM

## 2024-05-07 PROCEDURE — 1036F TOBACCO NON-USER: CPT | Performed by: STUDENT IN AN ORGANIZED HEALTH CARE EDUCATION/TRAINING PROGRAM

## 2024-05-07 PROCEDURE — 3008F BODY MASS INDEX DOCD: CPT | Performed by: STUDENT IN AN ORGANIZED HEALTH CARE EDUCATION/TRAINING PROGRAM

## 2024-05-07 ASSESSMENT — PATIENT HEALTH QUESTIONNAIRE - PHQ9
1. LITTLE INTEREST OR PLEASURE IN DOING THINGS: NOT AT ALL
SUM OF ALL RESPONSES TO PHQ9 QUESTIONS 1 AND 2: 0
2. FEELING DOWN, DEPRESSED OR HOPELESS: NOT AT ALL

## 2024-05-07 NOTE — PROGRESS NOTES
Liss Lund is a 23 y.o. year old female presenting for Hospital Follow-up       HPI:  Patient presented to the emergency room on May 3, 4 days ago with complaint of back pain that radiated to the front as well as some blood in her urine.  She states that those symptoms have resolved, but she is following up because there was an incidental finding of a pancreatic lesion on the body/tail junction that was not seen very clearly on the imaging and it was recommended that she get follow-up with MRI.  The ER provider also put in an oncology referral for her.  She is wondering if there is anything else she should be doing right now with this finding.  Otherwise, has been feeling well without any sick symptoms.    ROS:   All pertinent positive symptoms are included in history of present illness.    All other systems have been reviewed and are negative and noncontributory to this patient's current ailments.    Current Outpatient Medications   Medication Sig Dispense Refill    acetaminophen (TylenoL) 325 mg tablet Take 2 tablets (650 mg) by mouth every 6 hours if needed for mild pain (1 - 3) or moderate pain (4 - 6). 30 tablet 0    cholecalciferol (Vitamin D-3) 50,000 unit capsule Take 1 capsule (50,000 Units) by mouth 1 (one) time per week. 12 capsule 3    dicyclomine (Bentyl) 20 mg tablet Take 1 tablet (20 mg) by mouth 4 times a day as needed (bloating, cramping). 30 tablet 0    ondansetron ODT (Zofran-ODT) 4 mg disintegrating tablet Take 1 tablet (4 mg) by mouth every 8 hours if needed for nausea or vomiting for up to 7 days. 21 tablet 0    orphenadrine (Norflex) 100 mg 12 hr tablet Take 1 tablet (100 mg) by mouth 2 times a day as needed for muscle spasms for up to 7 days. Do not crush, chew, or split. 14 tablet 0    lidocaine (Lidoderm) 5 % patch Place 1 patch over 12 hours on the skin once daily. 12 hours on then 12 hours off (Patient not taking: Reported on 5/7/2024) 5 patch 0     No current facility-administered  "medications for this visit.       OBJECTIVE  Visit Vitals  /82   Pulse 90   Ht 1.613 m (5' 3.5\")   Wt 93.4 kg (206 lb)   LMP 04/22/2024 Comment: IUD   BMI 35.92 kg/m²   OB Status Having periods   Smoking Status Never   BSA 2.05 m²        Physical Exam:  GENERAL: Alert, oriented, pleasant, in no acute distress  HEENT: Head normocephalic, atraumatic  EXTREMITIES: no edema, no cyanosis  PSYCH: Appropriate mood and affect  SKIN: No rashes or lesions appreciated    Assessment/Plan   Diagnoses and all orders for this visit:  Pancreatic lesion ([facility])  ED provider note reviewed from May 3rd  CBC, CMP, Lipase, UA reviewed  CT a/p result reviewed  Order MR pancreas with GI/pancreas consult for further management of the pancreatic lesion incidentally noted on the CT  -     MRCP pancreas w and wo IV contrast; Future  -     Referral to Gastroenterology; Future           "

## 2024-05-23 ENCOUNTER — APPOINTMENT (OUTPATIENT)
Dept: RADIOLOGY | Facility: CLINIC | Age: 24
End: 2024-05-23
Payer: COMMERCIAL

## 2024-05-24 ENCOUNTER — HOSPITAL ENCOUNTER (OUTPATIENT)
Dept: RADIOLOGY | Facility: CLINIC | Age: 24
Discharge: HOME | End: 2024-05-24
Payer: COMMERCIAL

## 2024-05-24 DIAGNOSIS — K86.9 PANCREATIC LESION (HHS-HCC): ICD-10-CM

## 2024-05-24 PROCEDURE — 2550000001 HC RX 255 CONTRASTS: Performed by: STUDENT IN AN ORGANIZED HEALTH CARE EDUCATION/TRAINING PROGRAM

## 2024-05-24 PROCEDURE — 74183 MRI ABD W/O CNTR FLWD CNTR: CPT

## 2024-05-24 PROCEDURE — A9575 INJ GADOTERATE MEGLUMI 0.1ML: HCPCS | Performed by: STUDENT IN AN ORGANIZED HEALTH CARE EDUCATION/TRAINING PROGRAM

## 2024-05-24 RX ORDER — GADOTERATE MEGLUMINE 376.9 MG/ML
19 INJECTION INTRAVENOUS
Status: COMPLETED | OUTPATIENT
Start: 2024-05-24 | End: 2024-05-24

## 2024-05-24 RX ADMIN — GADOTERATE MEGLUMINE 19 ML: 376.9 INJECTION INTRAVENOUS at 11:49

## 2024-05-27 ENCOUNTER — HOSPITAL ENCOUNTER (OUTPATIENT)
Dept: RADIOLOGY | Facility: HOSPITAL | Age: 24
End: 2024-05-27
Payer: COMMERCIAL

## 2024-05-29 ENCOUNTER — HOSPITAL ENCOUNTER (OUTPATIENT)
Facility: HOSPITAL | Age: 24
Setting detail: OUTPATIENT SURGERY
Discharge: HOME | End: 2024-05-29
Attending: ORTHOPAEDIC SURGERY | Admitting: ORTHOPAEDIC SURGERY
Payer: COMMERCIAL

## 2024-05-29 VITALS
OXYGEN SATURATION: 98 % | TEMPERATURE: 96.8 F | HEART RATE: 72 BPM | RESPIRATION RATE: 16 BRPM | BODY MASS INDEX: 35.19 KG/M2 | HEIGHT: 64 IN | DIASTOLIC BLOOD PRESSURE: 76 MMHG | SYSTOLIC BLOOD PRESSURE: 113 MMHG | WEIGHT: 206.13 LBS

## 2024-05-29 DIAGNOSIS — S69.91XS FINGER INJURY, RIGHT, SEQUELA: Primary | ICD-10-CM

## 2024-05-29 DIAGNOSIS — R22.31 FINGER MASS, RIGHT: ICD-10-CM

## 2024-05-29 PROCEDURE — 88304 TISSUE EXAM BY PATHOLOGIST: CPT | Performed by: STUDENT IN AN ORGANIZED HEALTH CARE EDUCATION/TRAINING PROGRAM

## 2024-05-29 PROCEDURE — 3600000008 HC OR TIME - EACH INCREMENTAL 1 MINUTE - PROCEDURE LEVEL THREE: Performed by: ORTHOPAEDIC SURGERY

## 2024-05-29 PROCEDURE — 2500000005 HC RX 250 GENERAL PHARMACY W/O HCPCS: Performed by: ORTHOPAEDIC SURGERY

## 2024-05-29 PROCEDURE — 7100000009 HC PHASE TWO TIME - INITIAL BASE CHARGE: Performed by: ORTHOPAEDIC SURGERY

## 2024-05-29 PROCEDURE — 7100000010 HC PHASE TWO TIME - EACH INCREMENTAL 1 MINUTE: Performed by: ORTHOPAEDIC SURGERY

## 2024-05-29 PROCEDURE — 3600000003 HC OR TIME - INITIAL BASE CHARGE - PROCEDURE LEVEL THREE: Performed by: ORTHOPAEDIC SURGERY

## 2024-05-29 PROCEDURE — 26116 EXC HAND TUM DEEP < 1.5 CM: CPT | Performed by: ORTHOPAEDIC SURGERY

## 2024-05-29 PROCEDURE — 88304 TISSUE EXAM BY PATHOLOGIST: CPT | Mod: TC,GEALAB,PARLAB | Performed by: ORTHOPAEDIC SURGERY

## 2024-05-29 PROCEDURE — A4217 STERILE WATER/SALINE, 500 ML: HCPCS | Performed by: ORTHOPAEDIC SURGERY

## 2024-05-29 PROCEDURE — 2500000001 HC RX 250 WO HCPCS SELF ADMINISTERED DRUGS (ALT 637 FOR MEDICARE OP): Performed by: ORTHOPAEDIC SURGERY

## 2024-05-29 PROCEDURE — 2500000004 HC RX 250 GENERAL PHARMACY W/ HCPCS (ALT 636 FOR OP/ED): Performed by: ORTHOPAEDIC SURGERY

## 2024-05-29 RX ORDER — SODIUM CHLORIDE 0.9 G/100ML
IRRIGANT IRRIGATION AS NEEDED
Status: DISCONTINUED | OUTPATIENT
Start: 2024-05-29 | End: 2024-05-29 | Stop reason: HOSPADM

## 2024-05-29 RX ORDER — HYDROCODONE BITARTRATE AND ACETAMINOPHEN 5; 325 MG/1; MG/1
1 TABLET ORAL EVERY 6 HOURS PRN
Qty: 6 TABLET | Refills: 0 | Status: SHIPPED | OUTPATIENT
Start: 2024-05-29 | End: 2024-05-31

## 2024-05-29 RX ORDER — LIDOCAINE HYDROCHLORIDE AND EPINEPHRINE 10; 10 MG/ML; UG/ML
INJECTION, SOLUTION INFILTRATION; PERINEURAL AS NEEDED
Status: DISCONTINUED | OUTPATIENT
Start: 2024-05-29 | End: 2024-05-29 | Stop reason: HOSPADM

## 2024-05-29 RX ORDER — IBUPROFEN 200 MG
600 TABLET ORAL EVERY 8 HOURS PRN
COMMUNITY

## 2024-05-29 RX ORDER — CHLORHEXIDINE GLUCONATE 40 MG/ML
SOLUTION TOPICAL AS NEEDED
Status: DISCONTINUED | OUTPATIENT
Start: 2024-05-29 | End: 2024-05-29 | Stop reason: HOSPADM

## 2024-05-29 RX ORDER — BUPIVACAINE HCL/EPINEPHRINE 0.5-1:200K
VIAL (ML) INJECTION AS NEEDED
Status: DISCONTINUED | OUTPATIENT
Start: 2024-05-29 | End: 2024-05-29 | Stop reason: HOSPADM

## 2024-05-29 ASSESSMENT — PAIN SCALES - GENERAL
PAINLEVEL_OUTOF10: 0 - NO PAIN
PAINLEVEL_OUTOF10: 0 - NO PAIN

## 2024-05-29 ASSESSMENT — PAIN - FUNCTIONAL ASSESSMENT
PAIN_FUNCTIONAL_ASSESSMENT: 0-10
PAIN_FUNCTIONAL_ASSESSMENT: 0-10

## 2024-05-29 NOTE — H&P
History of Present Illness:       Chief Complaint   Patient presents with    Right Hand - Pain       Right middle finger pain s/p injury 5yrs ago         Patient presents today for evaluation of right middle finger pain/mass.  She had an injury to her middle finger about 5 years ago.  Ever since time of that injury she developed a bump about the dorsal aspect of the DIP joint near her nail.  This has been somewhat increasing in size and a second bump appeared next to the original 1 about 3 to 4 months ago.  She does have some soreness and has also noticed change in her nail.  No numbness or tingling.  No loss of  strength.  No drainage from the mass.     Medical History   History reviewed. No pertinent past medical history.        Medication Documentation Review Audit         Reviewed by Rupali Hoffmann MA (Medical Assistant) on 04/29/24 at 0936       Medication Order Taking? Sig Documenting Provider Last Dose Status   cholecalciferol (Vitamin D-3) 50,000 unit capsule [id number]   Take 1 capsule (50,000 Units) by mouth 1 (one) time per week. Denisse Ramires, DO   Active                          No Known Allergies     Social History               Socioeconomic History    Marital status: Significant Other       Spouse name: Not on file    Number of children: Not on file    Years of education: Not on file    Highest education level: Not on file   Occupational History    Not on file   Tobacco Use    Smoking status: Never    Smokeless tobacco: Never   Substance and Sexual Activity    Alcohol use: Yes       Alcohol/week: 2.0 standard drinks of alcohol       Types: 1 Glasses of wine, 1 Standard drinks or equivalent per week    Drug use: Never    Sexual activity: Yes       Partners: Male       Birth control/protection: I.U.D.   Other Topics Concern    Not on file   Social History Narrative    Not on file      Social Determinants of Health           Financial Resource Strain: Low Risk  (3/10/2023)     Received from  [facility]     Overall Financial Resource Strain (CARDIA)      Difficulty of Paying Living Expenses: Not very hard   Food Insecurity: No Food Insecurity (3/10/2023)     Received from [facility]     Hunger Vital Sign      Worried About Running Out of Food in the Last Year: Never true      Ran Out of Food in the Last Year: Never true   Transportation Needs: No Transportation Needs (3/10/2023)     Received from [facility]     PRAPARE - Transportation      Lack of Transportation (Medical): No      Lack of Transportation (Non-Medical): No   Physical Activity: Insufficiently Active (3/10/2023)     Received from [facility]     Exercise Vital Sign      Days of Exercise per Week: 2 days      Minutes of Exercise per Session: 30 min   Stress: Stress Concern Present (3/10/2023)     Received from [facility]     [employer] [place] [employer] - Occupational Stress Questionnaire      Feeling of Stress : To some extent   Social Connections: Moderately Integrated (3/10/2023)     Received from [facility]     Social Connection and Isolation Panel [NHANES]      Frequency of Communication with Friends and Family: More than three times a week      Frequency of Social Gatherings with Friends and Family: Once a week      Attends Orthodox Services: 1 to 4 times per year      Active Member of Clubs or Organizations: No      Attends Club or Organization Meetings: Never      Marital Status: Living with partner   Intimate Partner Violence: Not At Risk (3/10/2023)     Received from [facility]     Humiliation, Afraid, Rape, and Kick questionnaire      Fear of Current or Ex-Partner: No      Emotionally Abused: No      Physically Abused: No      Sexually Abused: No   Housing Stability: Low Risk  (3/10/2023)     Received from [facility]     Housing Stability Vital Sign      Unable to Pay for Housing in the Last Year: No      Number of Places Lived in the Last Year: 1      Unstable Housing in the Last Year: No             Surgical History   History reviewed. No pertinent surgical history.        Review of Systems   GENERAL: Negative for malaise, significant weight loss, fever  MUSCULOSKELETAL: see HPI  NEURO:  Negative     Physical Examination  Constitutional: Appears well-developed and well-nourished.  Head: Normocephalic and atraumatic.  Eyes: EOMI grossly  Cardiovascular: Intact distal pulses.   Respiratory: Effort normal. No respiratory distress.  Neurologic: Alert and oriented to person, place, and time.  Skin: Skin is warm and dry.  Hematologic / Lymphatic: No lymphedema, lymphangitis.  Psychiatric: normal mood and affect. Behavior is normal.   Musculoskeletal:  Right middle finger: Palpable mass extending from DIP to proximal eponychial fold.  There are nail plate changes to the tip of the digit with ridging and undulations.  Mass appears to transilluminate.  DIP with 0-25 degrees flexion.  Sensation intact distally.  No extensor lag appreciated.     Radiographs: Right hand radiographs from April 22, 2024 available for my review/interpretation: No fracture/dislocation.  Joint spaces grossly maintained.  Soft tissue fullness about the dorsal aspect of middle finger distal phalanx     Assessment:  Patient with right middle finger mass, possible mucous cyst     Plan:  Nature of the diagnosis was discussed with the patient.  We discussed potential differential diagnoses as well as risks and benefits of continued observation versus surgical excision.  We did discuss potential for recurrence as well as possible need for additional intervention pending pathology review.  After thoroughly reviewing patient wishes to proceed with surgical excision.     Risks and benefits of continued nonoperative versus operative treatment options were reviewed.  The surgical benefits and the potential complications were reviewed with the patient today, as well as the day surgical setting and the likely postoperative course.  Patient understands that  the goal of surgery is prevention of worsening symptoms and potential relief of some symptoms.  Risks discussed included, but were not limited to, residual/recurrent/worsening symptoms, pain, infection, bleeding, stiffness, injury to nerve/blood vessels/tendons, wound healing issues and possible need for reoperation.

## 2024-05-29 NOTE — DISCHARGE INSTRUCTIONS
Dr. Street Post-Operative Instructions  Hand/Wrist/Elbow    Activity:  Rest on the day of surgery.  Gradually resume your regular diet.    Anesthesia:  Numbing medication may last for 8-24 hours.    Post-Operative Medications:  You may resume your regular medications (including blood thinners).  You have been given a prescription for pain medication as needed.  You may also take over-the-counter anti-inflammatories and/or Tylenol for pain relief.  If you are taking the prescribed pain medication you must limit additional Tylenol (acetaminophen) intake to avoid overdose.    Dressings:  Keep your dressings clean and dry.  You may cover with a plastic bag or cast cover and seal bag to your skin above the bandage for showering.  If your dressing becomes wet or significantly bloodstained call the office at [phone].  Okay to remove outer dressings after 2-3 days and shower/wash hands.  Do not soak wound (I.e. no swimming pool, hot tub or dishes).    Post-Operative Care:  Keep the surgical site elevated above the level of your heart to limit swelling.  If your fingers are not included in the dressing you are encouraged to move your fingers regularly (full fist and full extension).  Regularly ice the surgical site.  You may also apply ice pack above the level of the dressing and this will cool the blood as it travels towards the surgical site.    Call Surgeon/Office at any time for:           Office Number: [phone]  Excessive bleeding  Loss of feeling (The local numbing medicine from surgery typically lasts 8-12 hours.  Nerve blocks can last 18-36 hours.)  Tight dressing: Make sure that you are elevating the operative site appropriately.  If no relief then call the office.                                                                                                        Circulation issues:  If fingers change to white or blue  Concerns/Problems with your surgery

## 2024-05-29 NOTE — OP NOTE
Pre-Operative Diagnosis: Right middle finger mass with associated nail deformity  Post-Operative Diagnosis: same, likely giant cell tumor  Procedure: Right middle finger mass excision  Surgeon: Yenifer  Assistant: None  Anesthesia: Local  Complications: none  Estimated blood loss: Minimal  Specimen: Right middle finger mass  Implants:  Nothing was implanted during the procedure  Findings: See below  Disposition: Good/PACU      Indications: Patient with slowly enlarging right middle finger mass after trauma.  She has an associated nail deformity.  We discussed risks and benefits of nonoperative versus operative treatment options.  After thoroughly reviewing patient wished to proceed with excisional biopsy.  We did discuss potential for recurrence as well as possible need for additional intervention pending pathology review.  She also understands potential for continued/worsening nail deformity.  Expected operative and postoperative courses reviewed and questions addressed.    Operative course: Patient was greeted in the preoperative holding area and the operative site was marked with indelible marker.  A digital block was performed with a total of 4 mL of half-and-half lidocaine and Marcaine with epinephrine using sterile technique after confirming patient identifiers.  Patient was brought back to the operating room suite where right upper extremity was prepped and draped in standard sterile fashion.  Timeout procedure was performed as per standard protocol.  Turnicot was applied to the digit.  Following this a longitudinal incision was made directly overlying the mass.  Gentle spreading was carried down through the subcutaneous tissue where the mass was identified.  It had the gross appearance of a giant cell tumor.  Care was taken to dissect the mass from surrounding soft tissues while carefully protecting the terminal extensor tendon.  A portion of this mass was actually found to travel to the other side of the  digit.  The other side the extensor tendon was able to be accessed through the initial incision and the remaining mass was carefully dissected from surrounding tissues.  There was no area of adherence on the side and the mass seem to be most adherent to the dorsal capsule of the DIP joint.  Dissection distally demonstrated the mass pushing on the germinal matrix.  Care was taken during this dissection to attempt preservation of the germinal matrix while fully removing the mass.  The mass was passed off the field for pathology analysis.  It measured approximately 1.5 x 1 x 0.5 cm.  Wound was irrigated and hemostasis achieved with bipolar electrocautery.  No extensor remained intact.  Wound was reapproximated with 4-0 Monocryl in a horizontal mattress fashion.  Exofin applied to the skin.  Dry sterile dressings were applied and patient was taken to recovery for further care.    She will follow-up in approximately 2 weeks for wound check and pathology review.  We did discuss potential for recurrence and need for additional intervention pending pathology review.

## 2024-06-11 ENCOUNTER — OFFICE VISIT (OUTPATIENT)
Dept: ORTHOPEDIC SURGERY | Facility: CLINIC | Age: 24
End: 2024-06-11
Payer: COMMERCIAL

## 2024-06-11 DIAGNOSIS — R22.31 FINGER MASS, RIGHT: Primary | ICD-10-CM

## 2024-06-11 PROCEDURE — 1036F TOBACCO NON-USER: CPT | Performed by: ORTHOPAEDIC SURGERY

## 2024-06-11 PROCEDURE — 3008F BODY MASS INDEX DOCD: CPT | Performed by: ORTHOPAEDIC SURGERY

## 2024-06-11 PROCEDURE — 99024 POSTOP FOLLOW-UP VISIT: CPT | Performed by: ORTHOPAEDIC SURGERY

## 2024-06-11 ASSESSMENT — PAIN - FUNCTIONAL ASSESSMENT: PAIN_FUNCTIONAL_ASSESSMENT: NO/DENIES PAIN

## 2024-06-11 NOTE — PROGRESS NOTES
History of Present Illness  Chief Complaint   Patient presents with    Right Hand - Post-op     5/29/24 right middle finger mass excision      Some residual stiffness continues to improve.     Examination  Right middle finger:  Incision is well healing. No signs of infection.  Sensation grossly intact distally.  Capillary refill less than 2 seconds.  0-40 degrees DIP flexion.  Terminal extensor intact.     Assessment:  Patient status post right middle finger mass excision.     Plan  Patient will begin scar tissue massage.  Continue mobilization and progression of activities.  We discussed expected recovery course as well as likely peak reaction at 4 to 6 weeks postoperatively.  Follow-up as needed.  We discussed likely pathology results of giant cell tumor, but she understands that pathology is still pending.  We did discuss potential for recurrence.  Questions addressed.

## 2024-06-13 ENCOUNTER — TELEMEDICINE (OUTPATIENT)
Dept: PRIMARY CARE | Facility: CLINIC | Age: 24
End: 2024-06-13
Payer: COMMERCIAL

## 2024-06-13 DIAGNOSIS — L03.011 CELLULITIS OF FINGER OF RIGHT HAND: Primary | ICD-10-CM

## 2024-06-13 PROCEDURE — 99213 OFFICE O/P EST LOW 20 MIN: CPT | Performed by: NURSE PRACTITIONER

## 2024-06-13 PROCEDURE — 3008F BODY MASS INDEX DOCD: CPT | Performed by: NURSE PRACTITIONER

## 2024-06-13 PROCEDURE — 1036F TOBACCO NON-USER: CPT | Performed by: NURSE PRACTITIONER

## 2024-06-13 RX ORDER — DOXYCYCLINE 100 MG/1
100 CAPSULE ORAL 2 TIMES DAILY
Qty: 20 CAPSULE | Refills: 0 | Status: SHIPPED | OUTPATIENT
Start: 2024-06-13 | End: 2024-06-23

## 2024-06-13 ASSESSMENT — ENCOUNTER SYMPTOMS
APPETITE CHANGE: 0
CONSTITUTIONAL NEGATIVE: 1
FATIGUE: 0
RESPIRATORY NEGATIVE: 1
CHILLS: 0
FEVER: 0
ARTHRALGIAS: 1

## 2024-06-13 NOTE — PROGRESS NOTES
Patient had a mass removed from her right middle finger on 5/29/24 with Dr. Street. The surgery went well. She saw her surgeon in follow up on 6/11/24 and everything was going well and she was healing as expected. Yesterday, her right finger started to swell and became very tender to the touch. She woke up in a lot of pain because of it.     Yesterday randomly woke up in a lot pain and her right finger was swollen. She did not injure it in any way. Patient has no fevers. She is feeling well overall. She does not have a history of a staph infection.    Swelling no drainage   Huts to move it   No known injury            Review of Systems   Constitutional: Negative.  Negative for appetite change, chills, fatigue and fever.   HENT: Negative.     Respiratory: Negative.     Musculoskeletal:  Positive for arthralgias.        Right middle finger swelling      Objective   There were no vitals taken for this visit.    Physical Exam  Constitutional:       General: She is not in acute distress.     Appearance: Normal appearance. She is not toxic-appearing or diaphoretic.   HENT:      Head: Atraumatic.   Pulmonary:      Effort: Pulmonary effort is normal.   Skin:     Comments: Patient with right middle finger swelling. There is an area of scabbing that is not new. The finger is slightly discolored post surgery - it is not red and has not changed. The swelling is pronounced since her surgery and follow up on 6/11. Pt can still move the digit. There is no purulent drainage.    Neurological:      Mental Status: She is alert.     Assessment/Plan   Problem List Items Addressed This Visit    None  Visit Diagnoses         Codes    Cellulitis of finger of right hand    -  Primary L03.011    Relevant Medications    doxycycline (Vibramycin) 100 mg capsule        This visit was completed via Epic. All issues as below were discussed and addressed but no physical exam was performed. If it was felt that the patient should be evaluated in the  clinic then they were directed there. The patient verbally consented to the visit. The patient verbally confirmed that she is located in the [facility] for the visit.     Approximately 15 minutes spent performing virtual video visit.     Patient with finger swelling present s/p finger surgery. This is a new onset swelling. Will cover for infection with doxycycline. Patient to continue to keep finger clean and dry. Reached out to pt's surgeon and will try to accommodate follow up appt tomorrow for pt. Advised ER for any worsening finger swelling, pain, fevers, chills or new/concerning symptoms; she agreed.

## 2024-06-14 ENCOUNTER — TELEPHONE (OUTPATIENT)
Dept: ORTHOPEDIC SURGERY | Facility: CLINIC | Age: 24
End: 2024-06-14
Payer: COMMERCIAL

## 2024-06-14 LAB
LABORATORY COMMENT REPORT: NORMAL
PATH REPORT.FINAL DX SPEC: NORMAL
PATH REPORT.GROSS SPEC: NORMAL
PATH REPORT.RELEVANT HX SPEC: NORMAL
PATH REPORT.TOTAL CANCER: NORMAL

## 2024-06-14 NOTE — TELEPHONE ENCOUNTER
I was contacted by patient's primary care provider yesterday regarding some swelling and redness that patient noticed in her operative finger yesterday morning.  Primary care provider prescribed antibiotic and she just took first dose this morning.  Still able to move the finger, but some soreness around the surgical site.  No fevers or chills.  Instructed patient that she should report to emergency room if any acute worsening.  She will tentatively plan on seeing me in clinic on Monday, but will call/report to emergency room if any worsening prior to then.  Questions addressed.    We did also review pathology results.  Potential for recurrence discussed.

## 2024-06-17 ENCOUNTER — APPOINTMENT (OUTPATIENT)
Dept: GASTROENTEROLOGY | Facility: HOSPITAL | Age: 24
End: 2024-06-17
Payer: COMMERCIAL

## 2024-07-12 ENCOUNTER — PATIENT MESSAGE (OUTPATIENT)
Dept: PRIMARY CARE | Facility: CLINIC | Age: 24
End: 2024-07-12
Payer: COMMERCIAL

## 2024-07-22 ENCOUNTER — APPOINTMENT (OUTPATIENT)
Dept: GASTROENTEROLOGY | Facility: HOSPITAL | Age: 24
End: 2024-07-22
Payer: COMMERCIAL

## 2025-03-10 ENCOUNTER — APPOINTMENT (OUTPATIENT)
Dept: PRIMARY CARE | Facility: CLINIC | Age: 25
End: 2025-03-10
Payer: COMMERCIAL

## 2025-06-02 ENCOUNTER — APPOINTMENT (OUTPATIENT)
Dept: PRIMARY CARE | Facility: CLINIC | Age: 25
End: 2025-06-02
Payer: COMMERCIAL

## 2025-06-30 ENCOUNTER — APPOINTMENT (OUTPATIENT)
Dept: PRIMARY CARE | Facility: CLINIC | Age: 25
End: 2025-06-30
Payer: COMMERCIAL

## 2025-06-30 VITALS
HEIGHT: 64 IN | SYSTOLIC BLOOD PRESSURE: 120 MMHG | HEART RATE: 95 BPM | BODY MASS INDEX: 38.93 KG/M2 | DIASTOLIC BLOOD PRESSURE: 80 MMHG | OXYGEN SATURATION: 97 % | WEIGHT: 228 LBS

## 2025-06-30 DIAGNOSIS — Z13.1 SCREENING FOR DIABETES MELLITUS: ICD-10-CM

## 2025-06-30 DIAGNOSIS — R10.2 PELVIC PAIN: ICD-10-CM

## 2025-06-30 DIAGNOSIS — Z00.00 ENCOUNTER FOR PREVENTIVE HEALTH EXAMINATION: Primary | ICD-10-CM

## 2025-06-30 DIAGNOSIS — R35.0 URINARY FREQUENCY: ICD-10-CM

## 2025-06-30 DIAGNOSIS — R53.83 OTHER FATIGUE: ICD-10-CM

## 2025-06-30 DIAGNOSIS — E55.9 VITAMIN D DEFICIENCY: ICD-10-CM

## 2025-06-30 DIAGNOSIS — R73.03 PRE-DIABETES: ICD-10-CM

## 2025-06-30 DIAGNOSIS — E28.2 PCOS (POLYCYSTIC OVARIAN SYNDROME): ICD-10-CM

## 2025-06-30 DIAGNOSIS — G25.81 RESTLESS LEG SYNDROME: ICD-10-CM

## 2025-06-30 DIAGNOSIS — Z13.29 SCREENING FOR ENDOCRINE DISORDER: ICD-10-CM

## 2025-06-30 DIAGNOSIS — Z13.9 SCREENING FOR CONDITION: ICD-10-CM

## 2025-06-30 DIAGNOSIS — Z13.6 SCREENING FOR CARDIOVASCULAR CONDITION: ICD-10-CM

## 2025-06-30 PROBLEM — K52.9 GASTROENTERITIS: Status: ACTIVE | Noted: 2022-10-25

## 2025-06-30 PROBLEM — Z98.890 POST-OPERATIVE STATE: Status: ACTIVE | Noted: 2021-03-15

## 2025-06-30 PROBLEM — O22.00 VARICOSE VEINS DURING PREGNANCY (HHS-HCC): Status: RESOLVED | Noted: 2022-10-22 | Resolved: 2025-06-30

## 2025-06-30 PROBLEM — O42.90 PROM (PREMATURE RUPTURE OF MEMBRANES) (HHS-HCC): Status: ACTIVE | Noted: 2022-12-08

## 2025-06-30 PROBLEM — O22.00 VARICOSE VEINS DURING PREGNANCY (HHS-HCC): Status: ACTIVE | Noted: 2022-10-22

## 2025-06-30 PROBLEM — K80.50 CHOLELITHIASIS, COMMON BILE DUCT: Status: ACTIVE | Noted: 2021-02-17

## 2025-06-30 PROBLEM — R31.29 MICROSCOPIC HEMATURIA: Status: ACTIVE | Noted: 2025-06-30

## 2025-06-30 PROBLEM — M79.18 MUSCULOSKELETAL PAIN: Status: ACTIVE | Noted: 2018-06-03

## 2025-06-30 PROBLEM — O42.90 PROM (PREMATURE RUPTURE OF MEMBRANES) (HHS-HCC): Status: RESOLVED | Noted: 2022-12-08 | Resolved: 2025-06-30

## 2025-06-30 PROBLEM — B37.31 CANDIDAL VULVOVAGINITIS: Status: ACTIVE | Noted: 2018-04-25

## 2025-06-30 PROBLEM — K21.9 GASTROESOPHAGEAL REFLUX DISEASE: Status: ACTIVE | Noted: 2021-09-10

## 2025-06-30 PROBLEM — R07.82 INTERCOSTAL PAIN: Status: ACTIVE | Noted: 2018-04-01

## 2025-06-30 LAB
POC APPEARANCE, URINE: ABNORMAL
POC BILIRUBIN, URINE: NEGATIVE
POC BLOOD, URINE: NEGATIVE
POC COLOR, URINE: YELLOW
POC GLUCOSE, URINE: NEGATIVE MG/DL
POC KETONES, URINE: NEGATIVE MG/DL
POC LEUKOCYTES, URINE: ABNORMAL
POC NITRITE,URINE: NEGATIVE
POC PH, URINE: 5 PH
POC PROTEIN, URINE: NEGATIVE MG/DL
POC SPECIFIC GRAVITY, URINE: 1.01
POC UROBILINOGEN, URINE: 0.2 EU/DL

## 2025-06-30 PROCEDURE — 81002 URINALYSIS NONAUTO W/O SCOPE: CPT | Performed by: STUDENT IN AN ORGANIZED HEALTH CARE EDUCATION/TRAINING PROGRAM

## 2025-06-30 PROCEDURE — 99395 PREV VISIT EST AGE 18-39: CPT | Performed by: STUDENT IN AN ORGANIZED HEALTH CARE EDUCATION/TRAINING PROGRAM

## 2025-06-30 PROCEDURE — 3008F BODY MASS INDEX DOCD: CPT | Performed by: STUDENT IN AN ORGANIZED HEALTH CARE EDUCATION/TRAINING PROGRAM

## 2025-06-30 PROCEDURE — 1036F TOBACCO NON-USER: CPT | Performed by: STUDENT IN AN ORGANIZED HEALTH CARE EDUCATION/TRAINING PROGRAM

## 2025-06-30 PROCEDURE — 99213 OFFICE O/P EST LOW 20 MIN: CPT | Performed by: STUDENT IN AN ORGANIZED HEALTH CARE EDUCATION/TRAINING PROGRAM

## 2025-06-30 RX ORDER — ROPINIROLE 0.25 MG/1
0.25 TABLET, FILM COATED ORAL NIGHTLY
Qty: 90 TABLET | Refills: 1 | Status: SHIPPED | OUTPATIENT
Start: 2025-06-30

## 2025-06-30 RX ORDER — NITROFURANTOIN 25; 75 MG/1; MG/1
100 CAPSULE ORAL 2 TIMES DAILY
Qty: 14 CAPSULE | Refills: 0 | Status: SHIPPED | OUTPATIENT
Start: 2025-06-30 | End: 2025-07-07

## 2025-06-30 ASSESSMENT — PROMIS GLOBAL HEALTH SCALE
RATE_MENTAL_HEALTH: GOOD
RATE_SOCIAL_SATISFACTION: GOOD
RATE_AVERAGE_PAIN: 3
CARRYOUT_PHYSICAL_ACTIVITIES: COMPLETELY
RATE_AVERAGE_FATIGUE: SEVERE
RATE_PHYSICAL_HEALTH: GOOD
EMOTIONAL_PROBLEMS: OFTEN
CARRYOUT_SOCIAL_ACTIVITIES: VERY GOOD
RATE_QUALITY_OF_LIFE: GOOD
RATE_GENERAL_HEALTH: GOOD

## 2025-06-30 NOTE — PROGRESS NOTES
Chief Complaint  Patient ID: Liss Lund is a 24 y.o. female who presents for Annual Exam (Dig with PCOS).         Reviewed all medications by prescribing practitioner or clinical pharmacist (such as prescriptions, OTCs, herbal therapies and supplements) and documented in the medical record.    History of Present Illness  1.  Physical exam.  Pap: last done in 2025 with OBGYN  Immunizations: Tdap done in 2022; has had HPV 2/3 in 2016  Overall doing well. She gets adequate exercise and maintains a well-balanced diet     2. PCOS/pre-diabetic   Liss notes being recently diagnosed with PCOS. She also struggles with weight-loss despite trying to diet and exercise.  Her last blood work was notable for pre-diabetes. She is interested in discussing a GLP-1 at this time    3. Fatigue  Liss has a history of vitamin D deficiency and notes being constantly fatigued  She would-like further work-up at this time    4. Leg pain  Liss believes she could have RLS given cramping in her legs worse at night. T  he discomfort sometimes keep her up and she takes warm showers in the middle of the night.   She also notes having mild swelling in her legs that has been worse since the summer.   Liss also has some mild tingling in her toes.     5. Right abdominal pain  Liss has been having some mild right-sided lower abdominal pain that she believes could be secondary to a UTI  She took a home UTI test that was positive only for leuks.  When she has a UTI, she usually does not get the urinary burning or frequency     Review of Systems  All pertinent positive symptoms are included in the history of present illness.    All other systems have been reviewed and are negative and noncontributory to this patient's current ailments.    Past Medical History  She has a past medical history of Anxiety and Asthma.    Surgical History  She has a past surgical history that includes Adenoidectomy; Cholecystectomy; and Esophagogastroduodenoscopy.    "  Social History  She reports that she has never smoked. She has never used smokeless tobacco. She reports current alcohol use of about 2.0 standard drinks of alcohol per week. She reports that she does not use drugs.    Family History[1]  Medications Prior to Visit[2]    Allergies  Latex    Immunization History   Administered Date(s) Administered    DT (pediatric) 04/08/2005    DTaP, Unspecified 02/09/2001, 07/17/2001, 09/26/2001, 01/09/2002, 01/21/2016    HPV 9-valent vaccine (GARDASIL 9) 05/16/2016, 06/16/2016    Hepatitis A vaccine, pediatric/adolescent (HAVRIX, VAQTA) 05/16/2016    Hepatitis B vaccine, 19 yrs and under (RECOMBIVAX, ENGERIX) 2000, 2000, 02/09/2001    HiB PRP-T conjugate vaccine (HIBERIX, ACTHIB) 02/09/2011    Hib (HbOC) 2000, 2000, 02/09/2001, 01/09/2002    MMR vaccine, subcutaneous (MMR II) 10/17/2001, 04/08/2005    Meningococcal ACWY-D (Menactra) 4-valent conjugate vaccine 08/30/2012, 08/16/2017    OPV 01/09/2002, 04/08/2005    Poliovirus vaccine, subcutaneous (IPOL) 2000, 2000    Tdap vaccine, age 7 year and older (BOOSTRIX, ADACEL) 08/30/2012, 05/03/2018    Varicella vaccine, subcutaneous (VARIVAX) 07/17/2001, 05/19/2015     Objective   Visit Vitals  /80   Pulse 95   Ht 1.626 m (5' 4\")   Wt 103 kg (228 lb)   SpO2 97%   BMI 39.14 kg/m²   OB Status Having periods   Smoking Status Never   BSA 2.16 m²     BSA: 2.16 meters squared     BP Readings from Last 3 Encounters:   06/30/25 120/80   05/29/24 113/76   05/07/24 110/82      Wt Readings from Last 3 Encounters:   06/30/25 103 kg (228 lb)   05/29/24 93.5 kg (206 lb 2.1 oz)   05/24/24 93 kg (205 lb)       Relevant Results  No visits with results within 1 Month(s) from this visit.   Latest known visit with results is:   Admission on 05/29/2024, Discharged on 05/29/2024   Component Date Value    Case Report 05/29/2024                      Value:Surgical Pathology                                Case: " "[id number]                                  Authorizing Provider:  Joe Street MD     Collected:           05/29/2024 0730              Ordering Location:     [facility]   Received:            05/29/2024 0855                                     OR                                                                           Pathologist:           Esther Esquivel MD                                                         Specimen:    GANGLION CYST, RIGHT MIDDLE FINGER MASS                                                    FINAL DIAGNOSIS 05/29/2024                      Value:Soft Tissue, Right Middle Finger Mass, Excision:                               Tenosynovial giant cell tumor, localized type      05/29/2024                      Value:By the signature on this report, the individual or group listed as making                           the Final Interpretation/Diagnosis certifies that they have reviewed this                           case.     Clinical History 05/29/2024                      Value:Pre-op diagnosis:                          Finger mass, right [R22.31]    Gross Description 05/29/2024                      Value:A: Received in formalin, labeled with the patient's name and hospital                           number and \" right middle finger mass\", are multiple pink-tan rubbery                           piece of soft tissue aggregating to 1.7 x 1.2 x 0.9 cm.  The outer surface                           of the specimen is inked blue.  A definitive cystic cavity is not grossly                           identified.  The specimen is serially sectioned to reveal white rubbery                           cut surfaces 2 cassettes.                          [facility]                                                    Gross dissection performed at:                          [facility]                          Department of Pathology                          [address]                          [place], [place] [address]                                                          The ASCVD Risk score (Jose Alberto DK, et al., 2019) failed to calculate for the following reasons:    The 2019 ASCVD risk score is only valid for ages 40 to 79    Physical Exam  CONSTITUTIONAL - well nourished, well developed, looks like stated age, in no acute distress, not ill-appearing, and not tired appearing  SKIN - normal skin color and pigmentation, normal skin turgor without rash, lesions, or nodules visualized  HEAD - no trauma, normocephalic  EYES - pupils are equal and reactive to light, extraocular muscles are intact, and normal external exam  NECK - supple without rigidity, no neck mass was observed, no thyromegaly or thyroid nodules  CHEST - clear to auscultation, no wheezing, no crackles and no rales, good effort  CARDIAC - regular rate and regular rhythm, no skipped beats, no murmur  ABDOMEN - no organomegaly, soft, nontender, nondistended, normal bowel sounds  EXTREMITIES - no obvious or evident edema, no obvious or evident deformities  NEUROLOGICAL - alert, oriented and no focal signs  PSYCHIATRIC - alert, pleasant and cordial, age-appropriate  IMMUNOLOGIC - no cervical lymphadenopathy    Assessment and Plan  Annual physical exam  Vaccines reviewed and discussed  Annual lab work ordered  Recommended patient maintain an adequate diet and get exercise as tolerated    2. PCOS/pre-diabetic  Hemoglobin A1c and lipid ordered with annual blood work  Referred to our clinical pharmacist as patient would be a good GLP-1 candidate given obesity, PCOS and pre-diabetes  Recommended low carbohydrate diet     3. Fatigue/vitamin D deficiency   Vitamin D levels ordered per annual blood work  DIANA/GI to rule-out autoimmune component     4. Restless leg syndrome  CBC + iron panel to r/out iron deficiency     5. Pelvic discomfort  POC UA notable for leukocytes  Will send over 7 day course of Macrobid  Urine sample sent  out for culture          [1]   Family History  Problem Relation Name Age of Onset    Diabetes Maternal Grandfather Alejandra     Diabetes Paternal Grandmother Lily     Breast cancer Maternal Grandmother Regi    [2]   Outpatient Medications Prior to Visit   Medication Sig Dispense Refill    acetaminophen (TylenoL) 325 mg tablet Take 2 tablets (650 mg) by mouth every 6 hours if needed for mild pain (1 - 3) or moderate pain (4 - 6). 30 tablet 0    ibuprofen 200 mg tablet Take 3 tablets (600 mg) by mouth every 8 hours if needed for mild pain (1 - 3).       No facility-administered medications prior to visit.

## 2025-07-02 LAB
25(OH)D3+25(OH)D2 SERPL-MCNC: 33 NG/ML (ref 30–100)
ALBUMIN SERPL-MCNC: 4.4 G/DL (ref 3.6–5.1)
ALP SERPL-CCNC: 73 U/L (ref 31–125)
ALT SERPL-CCNC: 30 U/L (ref 6–29)
ANION GAP SERPL CALCULATED.4IONS-SCNC: 13 MMOL/L (CALC) (ref 7–17)
AST SERPL-CCNC: 23 U/L (ref 10–30)
BACTERIA UR CULT: NORMAL
BILIRUB SERPL-MCNC: 0.5 MG/DL (ref 0.2–1.2)
BUN SERPL-MCNC: 9 MG/DL (ref 7–25)
CALCIUM SERPL-MCNC: 9.2 MG/DL (ref 8.6–10.2)
CHLORIDE SERPL-SCNC: 105 MMOL/L (ref 98–110)
CHOLEST SERPL-MCNC: 180 MG/DL
CHOLEST/HDLC SERPL: 3.2 (CALC)
CO2 SERPL-SCNC: 20 MMOL/L (ref 20–32)
CREAT SERPL-MCNC: 0.6 MG/DL (ref 0.5–0.96)
EGFRCR SERPLBLD CKD-EPI 2021: 128 ML/MIN/1.73M2
ERYTHROCYTE [DISTWIDTH] IN BLOOD BY AUTOMATED COUNT: 14.1 % (ref 11–15)
EST. AVERAGE GLUCOSE BLD GHB EST-MCNC: 111 MG/DL
EST. AVERAGE GLUCOSE BLD GHB EST-SCNC: 6.2 MMOL/L
FERRITIN SERPL-MCNC: 21 NG/ML (ref 16–154)
GLUCOSE SERPL-MCNC: 77 MG/DL (ref 65–99)
HBA1C MFR BLD: 5.5 %
HCT VFR BLD AUTO: 43.1 % (ref 35–45)
HDLC SERPL-MCNC: 56 MG/DL
HGB BLD-MCNC: 13.9 G/DL (ref 11.7–15.5)
IRON SATN MFR SERPL: 21 % (CALC) (ref 16–45)
IRON SERPL-MCNC: 73 MCG/DL (ref 40–190)
LDLC SERPL CALC-MCNC: 97 MG/DL (CALC)
MCH RBC QN AUTO: 27.9 PG (ref 27–33)
MCHC RBC AUTO-ENTMCNC: 32.3 G/DL (ref 32–36)
MCV RBC AUTO: 86.5 FL (ref 80–100)
NONHDLC SERPL-MCNC: 124 MG/DL (CALC)
PLATELET # BLD AUTO: 233 THOUSAND/UL (ref 140–400)
PMV BLD REES-ECKER: 12.3 FL (ref 7.5–12.5)
POTASSIUM SERPL-SCNC: 3.9 MMOL/L (ref 3.5–5.3)
PROT SERPL-MCNC: 7.5 G/DL (ref 6.1–8.1)
RBC # BLD AUTO: 4.98 MILLION/UL (ref 3.8–5.1)
SODIUM SERPL-SCNC: 138 MMOL/L (ref 135–146)
TIBC SERPL-MCNC: 349 MCG/DL (CALC) (ref 250–450)
TRIGL SERPL-MCNC: 170 MG/DL
TSH SERPL-ACNC: 2.41 MIU/L
WBC # BLD AUTO: 8.6 THOUSAND/UL (ref 3.8–10.8)

## 2025-07-03 LAB
ANA PAT SER IF-IMP: ABNORMAL
ANA SER QL IF: POSITIVE
ANA TITR SER IF: ABNORMAL TITER
APPEARANCE UR: CLEAR
BACTERIA #/AREA URNS HPF: ABNORMAL /HPF
BACTERIA UR CULT: ABNORMAL
BACTERIA UR CULT: ABNORMAL
BILIRUB UR QL STRIP: NEGATIVE
CENTROMERE B AB SER-ACNC: ABNORMAL AI
COLOR UR: YELLOW
DSDNA AB SER-ACNC: 3 IU/ML
ENA JO1 AB SER IA-ACNC: ABNORMAL AI
ENA RNP AB SER-ACNC: ABNORMAL AI
ENA SCL70 AB SER IA-ACNC: ABNORMAL AI
ENA SM AB SER IA-ACNC: ABNORMAL AI
ENA SM+RNP AB SER IA-ACNC: ABNORMAL AI
ENA SS-A AB SER IA-ACNC: ABNORMAL AI
ENA SS-B AB SER IA-ACNC: ABNORMAL AI
GLUCOSE UR QL STRIP: NEGATIVE
HGB UR QL STRIP: NEGATIVE
HYALINE CASTS #/AREA URNS LPF: ABNORMAL /LPF
KETONES UR QL STRIP: NEGATIVE
LABORATORY COMMENT REPORT: ABNORMAL
LEUKOCYTE ESTERASE UR QL STRIP: ABNORMAL
NITRITE UR QL STRIP: NEGATIVE
NUCLEOSOME AB SER IA-ACNC: ABNORMAL AI
PH UR STRIP: 7 [PH] (ref 5–8)
PROT UR QL STRIP: NEGATIVE
RBC #/AREA URNS HPF: ABNORMAL /HPF
RIBOSOMAL P AB SER-ACNC: ABNORMAL AI
SERVICE CMNT-IMP: ABNORMAL
SP GR UR STRIP: 1 (ref 1–1.03)
SQUAMOUS #/AREA URNS HPF: ABNORMAL /HPF
WBC #/AREA URNS HPF: ABNORMAL /HPF

## 2025-07-04 ENCOUNTER — PATIENT MESSAGE (OUTPATIENT)
Dept: PRIMARY CARE | Facility: CLINIC | Age: 25
End: 2025-07-04
Payer: COMMERCIAL

## 2025-07-04 DIAGNOSIS — R76.8 POSITIVE ANA (ANTINUCLEAR ANTIBODY): Primary | ICD-10-CM

## 2025-07-08 ENCOUNTER — APPOINTMENT (OUTPATIENT)
Dept: PHARMACY | Facility: HOSPITAL | Age: 25
End: 2025-07-08
Payer: COMMERCIAL

## 2025-07-08 DIAGNOSIS — E28.2 PCOS (POLYCYSTIC OVARIAN SYNDROME): ICD-10-CM

## 2025-07-08 DIAGNOSIS — R73.03 PRE-DIABETES: ICD-10-CM

## 2025-07-08 PROCEDURE — RXMED WILLOW AMBULATORY MEDICATION CHARGE

## 2025-07-08 RX ORDER — DULAGLUTIDE 0.75 MG/.5ML
0.75 INJECTION, SOLUTION SUBCUTANEOUS WEEKLY
Qty: 2 ML | Refills: 5 | Status: SHIPPED | OUTPATIENT
Start: 2025-07-08

## 2025-07-08 RX ORDER — BISMUTH SUBSALICYLATE 262 MG
1 TABLET,CHEWABLE ORAL DAILY
COMMUNITY

## 2025-07-08 NOTE — PATIENT INSTRUCTIONS
[employer] EDUCATION  Dosing: Inject 0.75 mg under the skin once WEEKLY. Try to inject on the same day each week (correlate it with an activity you do once a week, like trash day or Jehovah's witness day).     Administration video: https://trulicity.[email or web address]/how-to-use    Storage: Store unused pens in the refrigerator. Pens are good for 14 days at room temperature.     PENS ARE ONE TIME USE ONLY.     Directions:   Remove pen from refrigerator at least 15-30 minutes prior to injection so it does not sting/cause pain.   Unncap the Trulicity Pen  Place the base flat on your skin, then unlock.  Press and hold the button for up to 10 seconds. Listen for the first click. It means the injection has started. The second click means that the injection is complete.    **Can be given any time of day, with or without food**    Injection sites:       Misses doses: Administer the dose as soon as possible if there are at least 3 days (72 hours) until the next scheduled dose. If less than 3 days remain before the next scheduled dose, skip the missed dose and administer the next dose on the regularly scheduled day. In each case, patients can then resume their regular once weekly dosing schedule.    Side effects: May cause upset stomach, nausea, constipation, or bloating. Less commonly, may cause vomiting or diarrhea. If you have vomiting or diarrhea, please call the office.    Tips to prevent/minimize symptoms: eat smaller meals more frequently throughout the day, eat more bland foods until you know how the medication affects you.

## 2025-07-08 NOTE — PROGRESS NOTES
Clinical Pharmacy Appointment    Patient ID: Liss Lund is a 25 y.o. female who presents for Obesity and Polycystic Ovary Syndrome.    Pt is here for First appointment.     Referring Provider: Denisse Ramires DO  PCP: Denisse Ramires DO   Last visit with PCP: 6/30/25   Next visit with PCP: Not scheduled      Subjective     Medication Reconciliation:  Changed: None  Added: Hilario-Inositol & D-Chiro Inositol, multivitamin  Discontinued: Macrobid (one capsule left)    Drug Interactions  No relevant drug interactions were noted.    Medication System Management  Patient's preferred pharmacy: [facility]  Adherence/Organization: No concerns  Affordability/Accessibility: No concerns  Pharmacist on care team: Yes    HPI    OBESITY ASSESSMENT  Current Medications:   None    Previous Medications:  None    Pertinent PMH:   Personal/family history of thyroid cancer/MEN2: No  Personal history of pancreatitis: No  CAD: No  Heart attack: No  Stroke: No  Sleep apnea: No  Hyperlipidemia: No  Hypertension: No  PCOS: Yes, menstrual pain, hair growth  Fatty liver disease: No  Gallstones: Yes, gallbladder removed  Pre-diabetes: Yes  Reproductive status: Not pregnant/trying to become pregnant. IUD in place.    Weight monitoring:   Current weight 228 lbs (current BMI 39.14) - Obesity (class 2): BMI 35 to <40    Lifestyle:   Diet: Just started meal boxes (smaller portions/calorie restriction), counting calories, low-carbohydrate, more veggies/protein.  Exercise: Squats with 2 year old and running around outdoors with kids. Previously did [employer] videos.    The ASCVD Risk score (Amazonia DK, et al., 2019) failed to calculate for the following reasons:    The 2019 ASCVD risk score is only valid for ages 40 to 79     Objective   Allergies[1]  Social History     Social History Narrative    Not on file      Medication Review  Current Outpatient Medications   Medication Instructions    acetaminophen (TYLENOL) 650 mg, oral, Every  "6 hours PRN    ibuprofen 600 mg, Every 8 hours PRN    multivitamin tablet 1 tablet, Daily    NON FORMULARY 4 each, Daily    rOPINIRole (REQUIP) 0.25 mg, oral, Nightly    Trulicity 0.75 mg, subcutaneous, Weekly      Vitals  BP Readings from Last 3 Encounters:   06/30/25 120/80   05/29/24 113/76   05/07/24 110/82     Labs  A1C  Lab Results   Component Value Date    HGBA1C 5.5 07/01/2025    HGBA1C 5.9 (H) 03/18/2024    HGBA1C 5.1 03/26/2021     BMP  Lab Results   Component Value Date    CALCIUM 9.2 07/01/2025     07/01/2025    K 3.9 07/01/2025    CO2 20 07/01/2025     07/01/2025    BUN 9 07/01/2025    CREATININE 0.60 07/01/2025    EGFR 128 07/01/2025     LFTs  Lab Results   Component Value Date    ALT 30 (H) 07/01/2025    AST 23 07/01/2025    ALKPHOS 73 07/01/2025    BILITOT 0.5 07/01/2025     FLP  Lab Results   Component Value Date    TRIG 170 (H) 07/01/2025    CHOL 180 07/01/2025    LDLCALC 97 07/01/2025    HDL 56 07/01/2025     Urine Microalbumin  No results found for: \"MICROALBCREA\"  Weight Management  Wt Readings from Last 3 Encounters:   06/30/25 103 kg (228 lb)   05/29/24 93.5 kg (206 lb 2.1 oz)   05/24/24 93 kg (205 lb)      BMI Readings from Last 3 Encounters:   06/30/25 39.14 kg/m²   05/29/24 35.94 kg/m²   05/24/24 35.74 kg/m²        Assessment/Plan   Problem List Items Addressed This Visit    None  Visit Diagnoses         Pre-diabetes        Relevant Medications    dulaglutide (Trulicity) 0.75 mg/0.5 mL pen injector    Other Relevant Orders    Referral to Clinical Pharmacy      PCOS (polycystic ovarian syndrome)        Relevant Medications    dulaglutide (Trulicity) 0.75 mg/0.5 mL pen injector    Other Relevant Orders    Referral to Clinical Pharmacy        Current weight 228 lbs (current BMI 39.14) - Obesity (class 2): BMI 35 to <40. Patient is actively implementing positive lifestyle modifications (Ex. Reduced-calorie diet, increase physical activity). Patient would benefit from GLP1 therapy " for weight loss and management of weight-related co-morbidities (glucose control, PCOS). Zepbound and Wegovy are NOT covered by insurance for obesity and/or PCOS. Zepbound is only covered for patients with moderate-severe sleep apnea. Best option for GLP1 at this time is Trulicity.     Plan:   START Trulicity 0.75 mg weekly x 4 weeks    Clinical Pharmacist follow-up: 4 weeks, Telehealth visit    Future Appointments   Date Time Provider Department Center   8/5/2025 12:00 PM Hafsa Lock V PharmD [id number] [facility]   9/22/2025  1:15 PM Brenda Harrington MD [id number] [facility]       Continue all meds under the continuation of care with the referring provider and clinical pharmacy team.    Thank you,  Hafsa Lock  Clinical Pharmacist  [phone]    Verbal consent to manage patient's drug therapy was obtained from the patient. They were informed they may decline to participate or withdraw from participation in pharmacy services at any time.         [1]   Allergies  Allergen Reactions    Latex Rash and Unknown

## 2025-07-09 ENCOUNTER — PHARMACY VISIT (OUTPATIENT)
Dept: PHARMACY | Facility: CLINIC | Age: 25
End: 2025-07-09
Payer: MEDICAID

## 2025-07-30 ENCOUNTER — PHARMACY VISIT (OUTPATIENT)
Dept: PHARMACY | Facility: CLINIC | Age: 25
End: 2025-07-30
Payer: MEDICAID

## 2025-07-30 PROCEDURE — RXMED WILLOW AMBULATORY MEDICATION CHARGE

## 2025-08-05 ENCOUNTER — APPOINTMENT (OUTPATIENT)
Dept: PHARMACY | Facility: HOSPITAL | Age: 25
End: 2025-08-05
Payer: COMMERCIAL

## 2025-08-05 VITALS — WEIGHT: 223 LBS | BODY MASS INDEX: 38.28 KG/M2

## 2025-08-05 DIAGNOSIS — E66.9 OBESITY (BMI 30-39.9): ICD-10-CM

## 2025-08-05 DIAGNOSIS — E28.2 PCOS (POLYCYSTIC OVARIAN SYNDROME): ICD-10-CM

## 2025-08-05 DIAGNOSIS — R73.03 PRE-DIABETES: Primary | ICD-10-CM

## 2025-08-05 NOTE — PROGRESS NOTES
Clinical Pharmacy Appointment    Patient ID: Liss Lund is a 25 y.o. female who presents for Obesity, Prediabetes, and Polycystic Ovary Syndrome.    Pt is here for follow-up appointment.     Referring Provider: Denisse Ramires DO  PCP: Denisse Ramires DO   Last visit with PCP: 25   Next visit with PCP: Not scheduled      Subjective     Medication Reconciliation:  Changed: None  Added: None  Discontinued: None    Drug Interactions  No relevant drug interactions were noted.    Medication System Management  Patient's preferred pharmacy: [facility]  Adherence/Organization: No concerns  Affordability/Accessibility: No concerns  Pharmacist on care team: Yes    HPI    OBESITY/PRE-DIABETES/PCOS ASSESSMENT  Current Medications:   Trulicity 0.75 mg weekly (4 doses so far; Friday)    Side effects: Nausea after first 1st and 2nd dose.    Previous Medications:  None    Pertinent PMH:   Personal/family history of thyroid cancer/MEN2: No  Personal history of pancreatitis: No  CAD: No  Heart attack: No  Stroke: No  Sleep apnea: No  Hyperlipidemia: No  Hypertension: No  PCOS: Yes, menstrual pain, hair growth  Fatty liver disease: No  Gallstones: Yes, gallbladder removed  Pre-diabetes: Yes  Reproductive status: Not pregnant/trying to become pregnant. IUD in place.    Weight monitorin25 weight 228 lbs (BMI 39.14) - Obesity (class 2): BMI 35 to <40  25 weight 223 lbs (BMI 38.28) - Obesity (class 2): BMI 35 to <40    Lifestyle:   Diet:   Current: Reduced portions with starting medication.  Previous: Just started meal boxes (smaller portions/calorie restriction), counting calories, low-carbohydrate, more veggies/protein.  Exercise:   Current: More outside activity with children.  Previous: Squats with 2 year old and running around outdoors with kids. Previously did [employer] videos.    The ASCVD Risk score (Jose Alberto DK, et al., 2019) failed to calculate for the following reasons:    The 2019 ASCVD  "risk score is only valid for ages 40 to 79     Objective   Allergies[1]  Social History     Social History Narrative    Not on file      Medication Review  Current Outpatient Medications   Medication Instructions    acetaminophen (TYLENOL) 650 mg, oral, Every 6 hours PRN    ibuprofen 600 mg, Every 8 hours PRN    multivitamin tablet 1 tablet, Daily    NON FORMULARY 4 each, Daily    rOPINIRole (REQUIP) 0.25 mg, oral, Nightly    Trulicity 0.75 mg, subcutaneous, Weekly      Vitals  BP Readings from Last 3 Encounters:   06/30/25 120/80   05/29/24 113/76   05/07/24 110/82     Labs  A1C  Lab Results   Component Value Date    HGBA1C 5.5 07/01/2025    HGBA1C 5.9 (H) 03/18/2024    HGBA1C 5.1 03/26/2021     BMP  Lab Results   Component Value Date    CALCIUM 9.2 07/01/2025     07/01/2025    K 3.9 07/01/2025    CO2 20 07/01/2025     07/01/2025    BUN 9 07/01/2025    CREATININE 0.60 07/01/2025    EGFR 128 07/01/2025     LFTs  Lab Results   Component Value Date    ALT 30 (H) 07/01/2025    AST 23 07/01/2025    ALKPHOS 73 07/01/2025    BILITOT 0.5 07/01/2025     FLP  Lab Results   Component Value Date    TRIG 170 (H) 07/01/2025    CHOL 180 07/01/2025    LDLCALC 97 07/01/2025    HDL 56 07/01/2025     Urine Microalbumin  No results found for: \"MICROALBCREA\"  Weight Management  Wt Readings from Last 3 Encounters:   08/05/25 101 kg (223 lb)   06/30/25 103 kg (228 lb)   05/29/24 93.5 kg (206 lb 2.1 oz)      BMI Readings from Last 3 Encounters:   08/05/25 38.28 kg/m²   06/30/25 39.14 kg/m²   05/29/24 35.94 kg/m²        Assessment/Plan   Problem List Items Addressed This Visit    None  Visit Diagnoses         Pre-diabetes    -  Primary    Relevant Orders    Referral to Clinical Pharmacy      PCOS (polycystic ovarian syndrome)        Relevant Orders    Referral to Clinical Pharmacy      Obesity (BMI 30-39.9)        Relevant Orders    Referral to Clinical Pharmacy        Current regimen includes Trulcity 0.75 mg weekly " (medication options restricted with insurance coverage). Patient's current weight reported as 223 lbs. Has lost 5 lbs (2.2% of TBW; starting weight 228 lbs) since starting therapy plan. Patient is actively implementing positive lifestyle modifications (Ex. Reduced-calorie diet, increase physical activity). No changes at this time with current progress.    Plan:   Continue Trulicity 0.75 mg weekly    Clinical Pharmacist follow-up: 4 weeks, Telehealth visit    Future Appointments   Date Time Provider Department Center   9/2/2025 12:00 PM Hafsa Lock V PharmD [id number] [facility]   9/22/2025  1:15 PM Brenda Harrington MD [id number] [facility]       Continue all meds under the continuation of care with the referring provider and clinical pharmacy team.    Thank you,  Hafsa Lock  Clinical Pharmacist  [phone]    Verbal consent to manage patient's drug therapy was obtained from the patient. They were informed they may decline to participate or withdraw from participation in pharmacy services at any time.         [1]   Allergies  Allergen Reactions    Latex Rash and Unknown

## 2025-09-02 ENCOUNTER — APPOINTMENT (OUTPATIENT)
Dept: PHARMACY | Facility: HOSPITAL | Age: 25
End: 2025-09-02
Payer: COMMERCIAL

## 2025-09-02 PROCEDURE — RXMED WILLOW AMBULATORY MEDICATION CHARGE

## 2025-09-04 ENCOUNTER — PHARMACY VISIT (OUTPATIENT)
Dept: PHARMACY | Facility: CLINIC | Age: 25
End: 2025-09-04
Payer: MEDICAID

## 2025-09-22 ENCOUNTER — APPOINTMENT (OUTPATIENT)
Dept: RHEUMATOLOGY | Facility: CLINIC | Age: 25
End: 2025-09-22
Payer: COMMERCIAL

## 2025-09-30 ENCOUNTER — APPOINTMENT (OUTPATIENT)
Dept: PHARMACY | Facility: HOSPITAL | Age: 25
End: 2025-09-30
Payer: COMMERCIAL

## (undated) DEVICE — SOLUTION, IRRIGATION, SODIUM CHLORIDE 0.9%, 1000 ML, POUR BOTTLE

## (undated) DEVICE — TISSUE ADHESIVE, PREMIERPRO EXOFIN, PRECISION PEN HV, 1.0ML

## (undated) DEVICE — PREP TRAY, SKIN, DRY, W/GLOVES

## (undated) DEVICE — DRESSING, NON-ADHERENT, TELFA, OUCHLESS, 3 X 4 IN, STERILE

## (undated) DEVICE — STOCKINETTE, TUBE, BLN, ST, 1 PLY, 4 X 48 IN, LF

## (undated) DEVICE — SUTURE, MONOCRYL, 4-0, 18 IN, PS2, UNDYED

## (undated) DEVICE — TOURNIQUET, DIGIT, TOURNI-COT, MEDIUM

## (undated) DEVICE — DRESSING, GAUZE, PETROLATUM, PATCH, XEROFORM, 1 X 8 IN, STERILE

## (undated) DEVICE — Device